# Patient Record
Sex: FEMALE | Race: WHITE | Employment: FULL TIME | URBAN - METROPOLITAN AREA
[De-identification: names, ages, dates, MRNs, and addresses within clinical notes are randomized per-mention and may not be internally consistent; named-entity substitution may affect disease eponyms.]

---

## 2017-01-12 ENCOUNTER — ALLSCRIPTS OFFICE VISIT (OUTPATIENT)
Dept: OTHER | Facility: OTHER | Age: 22
End: 2017-01-12

## 2017-02-27 ENCOUNTER — GENERIC CONVERSION - ENCOUNTER (OUTPATIENT)
Dept: OTHER | Facility: OTHER | Age: 22
End: 2017-02-27

## 2017-05-15 ENCOUNTER — ALLSCRIPTS OFFICE VISIT (OUTPATIENT)
Dept: OTHER | Facility: OTHER | Age: 22
End: 2017-05-15

## 2017-07-31 ENCOUNTER — GENERIC CONVERSION - ENCOUNTER (OUTPATIENT)
Dept: OTHER | Facility: OTHER | Age: 22
End: 2017-07-31

## 2017-09-22 ENCOUNTER — ALLSCRIPTS OFFICE VISIT (OUTPATIENT)
Dept: OTHER | Facility: OTHER | Age: 22
End: 2017-09-22

## 2017-09-22 DIAGNOSIS — H65.01 ACUTE SEROUS OTITIS MEDIA OF RIGHT EAR: ICD-10-CM

## 2017-09-22 DIAGNOSIS — M26.621 ARTHRALGIA OF RIGHT TEMPOROMANDIBULAR JOINT: ICD-10-CM

## 2018-01-11 NOTE — PROGRESS NOTES
Assessment    1  URI (upper respiratory infection) (465 9) (J06 9)    Plan  URI (upper respiratory infection)    · Zithromax Z-Gianni 250 MG Oral Tablet (Azithromycin); TAKE 2 TABLETS ON DAY 1  THEN TAKE 1 TABLET A DAY FOR 4 DAYS   · Call [phone] if: The symptoms are not better in 2 weeks ; Status:Complete;    Done: 03KZH4652   · Call [phone] if: The symptoms seem worse ; Status:Complete;   Done:  24SSI8561   · Seek Immediate Medical Attention if: Breathing starts to have a wheeze or whistling  sound ; Status:Complete;   Done: 40PND4330   · Rapid Flu A and B- POC; Source:Nose; Status:Complete;   Done: 07EAI3783 04:25PM   · Rapid StrepA- POC; Source:Throat; Status:Complete;   Done: 27IBS0949 04:20PM    Discussion/Summary    Rapid strep negative  rapid flu negative  continue with symptom  start Guerraview  use Dulera BID while on antibiotics  Chief Complaint    1  Cold Symptoms   2  Fever, > 36 months   3  Sore Throat    History of Present Illness  HPI: 4 day history of sore throat with nasal congestion  intermittent cough  no wheezing  history of asthma on Dulera daily  she has been using prn DayQuil      Review of Systems    Constitutional: fever and feeling poorly  ENT: earache, but as noted in HPI and no nasal discharge  Cardiovascular: no chest pain  Respiratory: as noted in HPI, no shortness of breath and no wheezing  Gastrointestinal: no nausea, no vomiting and no diarrhea  Musculoskeletal: no myalgias  Integumentary: no rashes  Neurological: no headache  Active Problems    1  Asthma (493 90) (J45 909)   2  Dysmenorrhea (625 3) (N94 6)   3  Eczema (692 9) (L30 9)   4  Moderate persistent asthma (493 90) (J45 40)   5  Sleep disturbances (780 50) (G47 9)    Past Medical History    1  History of Cellulitis of right leg (682 6) (L03 115)   2  History of Concussion (V15 52)   3  History of Laceration Of Lip (873 43)   4   History of Left serous otitis media (381 4) (H65 92)    Family History    1  Family history of Colon Cancer (V16 0)    2  Family history of Hypertension (V17 49)    3  Family history of Heart Disease (V17 49)    4  Family history of Heart Disease (V17 49)    Social History    · Denied: History of Alcohol Use (History)   · Denied: History of Daily Coffee Consumption (___ Cups/Day)   · Denied: History of Daily Cola Consumption (___ Cans/Day)   · Denied: History of Daily Tea Consumption (___ Cups/Day)   · Marital History - Single   · Never A Smoker    Surgical History    1  History of Ear Pressure Equalization Tube, Insertion, Bilaterally   2  History of Ear Surgery   3  History of Nose Surgery   4  History of Sinus Surgery    Current Meds   1  Allegra 180 MG TABS; Therapy: (Recorded:60Guv5027) to Recorded   2  Dulera AERO; Therapy: (Recorded:10Mar2014) to Recorded   3  EpiPen 2-Gianni 0 3 MG/0 3ML Injection Solution Auto-injector; Therapy: 77UNS6017 to Recorded   4  Gildess 1/20 TABS; Take 1 tablet daily Recorded   5  ProAir  (90 Base) MCG/ACT Inhalation Aerosol Solution; Therapy: 58Dco1965 to (Last Rx:59Elx6489)  Requested for: 81Vyy7813 Ordered    Allergies    1  No Known Drug Allergies    2  Nuts    Vitals   Recorded: 61Lqg1372 04:09PM   Temperature 99 3 F   Heart Rate 76   Respiration 16   Systolic 128   Diastolic 68   Height 5 ft 4 5 in   Weight 218 lb    BMI Calculated 36 84   BSA Calculated 2 04     Physical Exam    Constitutional - General appearance: No acute distress, well appearing and well nourished  Head and Face - Palpation of the face and sinuses: Normal, no sinus tenderness  Eyes - Conjunctiva and lids: No injection, edema or discharge  Ears, Nose, Mouth, and Throat - Otoscopic examination: Tympanic membranes gray, translucent with good bony landmarks and light reflex  Canals patent without erythema  Oropharynx: Abnormal  The posterior pharynx was erythematous  Neck - Neck: Supple, symmetric, no masses  Thyroid: No thyromegaly     Pulmonary - Auscultation of lungs: Clear bilaterally  Cardiovascular - Auscultation of heart: Regular rate and rhythm, normal S1 and S2, no murmur  Lymphatic - Palpation of lymph nodes in neck: No anterior or posterior cervical lymphadenopathy        Results/Data  Rapid Flu A and B- POC 12TWX7391 04:25PM [facility]     Test Name Result Flag Reference   Rapid Flu A Negative     Rapid Flu B Negative       Rapid StrepA- POC 33JVO4493 04:20PM [facility]     Test Name Result Flag Reference   Rapid Strep Negative         Future Appointments    Date/Time Provider Specialty Site   06/24/2016 11:00 AM Reymundo Molina,  Internal Medicine [place] [address]   Electronically signed by : RONALD Pate ,MD; Feb 22 2016  5:34PM EST                       (Author)

## 2018-01-13 VITALS
HEIGHT: 66 IN | BODY MASS INDEX: 33.01 KG/M2 | TEMPERATURE: 98.4 F | HEART RATE: 80 BPM | WEIGHT: 205.38 LBS | RESPIRATION RATE: 16 BRPM | DIASTOLIC BLOOD PRESSURE: 70 MMHG | SYSTOLIC BLOOD PRESSURE: 114 MMHG

## 2018-01-14 NOTE — PROGRESS NOTES
Assessment    1  Well adult exam (V70 0) (Z00 00)   2  Social anxiety disorder (300 23) (F40 10)    Discussion/Summary  health maintenance visit Currently, she eats a healthy diet  cervical cancer screening is current Breast cancer screening: mammogram has been ordered and breast cancer screening is not indicated  Advice and education were given regarding weight loss and vitamin D supplements  Patient discussion: discussed with the patient  PT WITH ANXIETY- SHE IS ON ESCITALOPRAM 20 MG BUT FEELS UNEMOTIONAL - WILL CUT IN HALF AND IF NO BETTER IN 2 WEEKS WILL ADD EFFEXOR MID DAY;   [id number] COMPLETE  FOLLOW UP AFTER CHANGING DOSE;  The patient was counseled regarding diagnostic results, instructions for management, risk factor reductions, prognosis, patient and family education, impressions, risks and benefits of treatment options, importance of compliance with treatment  Chief Complaint  PATIENT HERE FOR COLLEGE PHYSICAL  SHE FEELS WELL       History of Present Illness  HM, Adult Female: The patient is being seen for a health maintenance evaluation  The last health maintenance visit was 12 month(s) ago  General Health: She has regular dental visits  She denies vision problems  She denies hearing loss  Immunizations status: up to date  Lifestyle:  She consumes a diverse and healthy diet  She does not have any weight concerns  She exercises regularly  She does not use tobacco  She denies alcohol use  She denies drug use  Reproductive health: the patient is premenopausal    Screening: cancer screening reviewed and current  metabolic screening reviewed and current  risk screening reviewed and current  HPI: PATIENT HERE FOR COLLEGE PHYSICAL  SHE WILL BE LIVING OFF CAMPUS      Review of Systems    Constitutional: No fever, no chills, feels well, no tiredness, no recent weight gain or weight loss, not feeling poorly and not feeling tired     Eyes: No complaints of eye pain, no red eyes, no eyesight problems, no discharge, no dry eyes, no itching of eyes, no eyesight problems and no purulent discharge from the eyes  ENT: no complaints of earache, no loss of hearing, no nose bleeds, no nasal discharge, no sore throat, no hoarseness, no earache, no nosebleeds, no sore throat, no hearing loss, no nasal discharge and no hoarseness  Cardiovascular: No complaints of slow heart rate, no fast heart rate, no chest pain, no palpitations, no leg claudication, no lower extremity edema, the heart rate was not slow and the heart rate was not fast    Respiratory: No complaints of shortness of breath, no wheezing, no cough, no SOB on exertion, no orthopnea, no PND, no shortness of breath, no cough and no shortness of breath during exertion  Gastrointestinal: No complaints of abdominal pain, no constipation, no nausea or vomiting, no diarrhea, no bloody stools  Genitourinary: No complaints of dysuria, no incontinence, no pelvic pain, no dysmenorrhea, no vaginal discharge or bleeding, no dysuria and no incontinence  Musculoskeletal: No complaints of arthralgias, no myalgias, no joint swelling or stiffness, no limb pain or swelling, no arthralgias, no joint swelling and no joint stiffness  Integumentary: No complaints of skin rash or lesions, no itching, no skin wounds, no breast pain or lump, no rashes, no itching, no skin lesions and no skin wound  Neurological: No complaints of headache, no confusion, no convulsions, no numbness, no dizziness or fainting, no tingling, no limb weakness, no difficulty walking  Psychiatric: Not suicidal, no sleep disturbance, no anxiety or depression, no change in personality, no emotional problems, no anxiety, no sleep disturbances and no depression  Endocrine: No complaints of proptosis, no hot flashes, no muscle weakness, no deepening of the voice, no feelings of weakness, no proptosis and no hot flashes     Hematologic/Lymphatic: No complaints of swollen glands, no swollen glands in the neck, does not bleed easily, does not bruise easily, no swollen glands, no tendency for easy bleeding, no tendency for easy bruising and no swollen glands in the neck  ROS reviewed  Active Problems    1  Allergic rhinitis (477 9) (J30 9)   2  Asthma (493 90) (J45 909)   3  Dysmenorrhea (625 3) (N94 6)   4  Eczema (692 9) (L30 9)   5  Moderate persistent asthma (493 90) (J45 40)   6  Need for Menactra vaccination (V03 89) (Z23)   7  Sleep disturbances (780 50) (G47 9)   8  Social anxiety disorder (300 23) (F40 10)   9  Well adult exam (V70 0) (Z00 00)    Past Medical History    · History of Cellulitis of right leg (682 6) (L03 115)   · History of Concussion (V15 52)   · History of Laceration Of Lip (873 43)   · History of Left serous otitis media (381 4) (H65 92)    Surgical History    · History of Ear Pressure Equalization Tube, Insertion, Bilaterally   · History of Ear Surgery   · History of Nose Surgery   · History of Sinus Surgery    Family History  Maternal Grandmother    · Family history of Colon Cancer (V16 0)  Paternal Grandmother    · Family history of Hypertension (V17 49)  Maternal Grandfather    · Family history of Heart Disease (V17 49)  Paternal Grandfather    · Family history of Heart Disease (V17 49)    Social History    · Denied: History of Alcohol Use (History)   · Denied: History of Daily Coffee Consumption (___ Cups/Day)   · Denied: History of Daily Cola Consumption (___ Cans/Day)   · Denied: History of Daily Tea Consumption (___ Cups/Day)   · Marital History - Single   · Never A Smoker    Current Meds   1  Allegra 180 MG TABS (Fexofenadine HCl); Therapy: (Recorded:47Jaw8117) to Recorded   2  Ashlyna 0 15-0 03 &0 01 MG Oral Tablet; TAKE 1 TABLET DAILY; Therapy: 82NLH5672 to (Evaluate:07Jan2018)  Requested for: 48PVM9352; Last   Rx:12Jan2017 Ordered   3  Dulera AERO; Therapy: (Recorded:10Mar2014) to Recorded   4   EpiPen 2-Gianni 0 3 MG/0 3ML Injection Solution Auto-injector; Therapy: 75QVP2074 to Recorded   5  Escitalopram Oxalate 20 MG Oral Tablet; take 1 tablet every day; Therapy: 86YAS6784 to (72 240 26 09)  Requested for: 76IKG8980; Last   Rx:12Jan2017 Ordered   6  ProAir  (90 Base) MCG/ACT Inhalation Aerosol Solution; INHALE 1 TO 2 PUFFS   EVERY 4 TO 6 HOURS AS NEEDED; Therapy: 41Kqh9128 to (Evaluate:07Jan2018)  Requested for: 83JQI3312; Last   Rx:12Jan2017 Ordered    Allergies    1  No Known Drug Allergies    2  Nuts    Vitals   Recorded: 06AXW1415 02:28PM   Temperature 97 6 F   Heart Rate 76   Respiration 18   Systolic 671   Diastolic 78   Height 5 ft 5 75 in   Weight 217 lb 6 4 oz   BMI Calculated 35 36   BSA Calculated 2 07   BMI Percentile 97 %   2-20 Stature Percentile 71 %   2-20 Weight Percentile 99 %     Physical Exam    Constitutional   General appearance: No acute distress, well appearing and well nourished  Eyes   Conjunctiva and lids: No swelling, erythema or discharge  Pupils and irises: Equal, round, reactive to light  Ears, Nose, Mouth, and Throat   External inspection of ears and nose: Normal     Otoscopic examination: Tympanic membranes translucent with normal light reflex  Canals patent without erythema  Nasal mucosa, septum, and turbinates: Normal without edema or erythema  Lips, teeth, and gums: Normal, good dentition  Oropharynx: Normal with no erythema, edema, exudate or lesions  Inspection of the oropharynx showed fully visible tonsils, uvula and soft palate (Mallampati class 1)  Oral mucosa was moist, but was normal  The palate examination showed no abnormalities  The tongue was normal  The tonsils were normal    Neck   Neck: Supple, symmetric, trachea midline, no masses  Thyroid: Normal, no thyromegaly  Pulmonary   Respiratory effort: No increased work of breathing or signs of respiratory distress  Percussion of chest: Normal     Auscultation of lungs: Clear to auscultation    Auscultation of the lungs revealed no expiratory wheezing, normal expiratory time and no inspiratory wheezing  no rales or crackles were heard bilaterally  no rhonchi  no friction rub  no wheezing  no diminished breath sounds  no bronchial breath sounds  Cardiovascular   Palpation of heart: Normal PMI, no thrills  Auscultation of heart: Normal rate and rhythm, normal S1 and S2, no murmurs  Carotid pulses: 2+ bilaterally  Pedal pulses: 2+ bilaterally  Examination of extremities for edema and/or varicosities: Normal     Abdomen   Abdomen: Non-tender, no masses  Liver and spleen: No hepatomegaly or splenomegaly  Lymphatic   Palpation of lymph nodes in neck: No lymphadenopathy  Musculoskeletal   Gait and station: Normal     Digits and nails: Normal without clubbing or cyanosis  Joints, bones, and muscles: Normal     Range of motion: Normal     Skin   Skin and subcutaneous tissue: Normal without rashes or lesions  Palpation of skin and subcutaneous tissue: Normal turgor  Neurologic   Cranial nerves: Cranial nerves II-XII intact  Cortical function: Normal mental status  Sensation: No sensory loss  Psychiatric   Judgment and insight: Normal     Orientation to person, place, and time: Normal        Health Management  Health Maintenance   Pediatric / Adolescent Wellness Visit; every 1 year; Next Due: 49Tdh7897; Overdue    Signatures   Electronically signed by :  Mariusz [address] [place] [address]; May 15 2017  3:12PM EST                       (Author)

## 2018-01-15 VITALS
HEART RATE: 68 BPM | WEIGHT: 217 LBS | SYSTOLIC BLOOD PRESSURE: 102 MMHG | HEIGHT: 66 IN | DIASTOLIC BLOOD PRESSURE: 74 MMHG | BODY MASS INDEX: 34.87 KG/M2 | RESPIRATION RATE: 16 BRPM | TEMPERATURE: 99.1 F

## 2018-01-22 VITALS
RESPIRATION RATE: 18 BRPM | BODY MASS INDEX: 34.94 KG/M2 | DIASTOLIC BLOOD PRESSURE: 78 MMHG | HEART RATE: 76 BPM | WEIGHT: 217.4 LBS | HEIGHT: 66 IN | TEMPERATURE: 97.6 F | SYSTOLIC BLOOD PRESSURE: 112 MMHG

## 2018-02-03 DIAGNOSIS — F41.1 GENERALIZED ANXIETY DISORDER: Primary | ICD-10-CM

## 2018-02-04 RX ORDER — ESCITALOPRAM OXALATE 20 MG/1
TABLET ORAL
Qty: 30 TABLET | Refills: 3 | Status: SHIPPED | OUTPATIENT
Start: 2018-02-04 | End: 2018-02-13 | Stop reason: SDUPTHER

## 2018-02-12 ENCOUNTER — TELEPHONE (OUTPATIENT)
Dept: FAMILY MEDICINE CLINIC | Facility: CLINIC | Age: 23
End: 2018-02-12

## 2018-02-13 DIAGNOSIS — F41.1 GENERALIZED ANXIETY DISORDER: ICD-10-CM

## 2018-02-13 RX ORDER — ESCITALOPRAM OXALATE 20 MG/1
20 TABLET ORAL DAILY
Qty: 90 TABLET | Refills: 3 | Status: SHIPPED | OUTPATIENT
Start: 2018-02-13 | End: 2018-05-07

## 2018-05-07 ENCOUNTER — APPOINTMENT (OUTPATIENT)
Dept: LAB | Facility: MEDICAL CENTER | Age: 23
End: 2018-05-07
Payer: COMMERCIAL

## 2018-05-07 ENCOUNTER — OFFICE VISIT (OUTPATIENT)
Dept: FAMILY MEDICINE CLINIC | Facility: CLINIC | Age: 23
End: 2018-05-07
Payer: COMMERCIAL

## 2018-05-07 VITALS
BODY MASS INDEX: 32.46 KG/M2 | HEIGHT: 65 IN | TEMPERATURE: 97.8 F | RESPIRATION RATE: 16 BRPM | DIASTOLIC BLOOD PRESSURE: 70 MMHG | WEIGHT: 194.8 LBS | HEART RATE: 68 BPM | SYSTOLIC BLOOD PRESSURE: 100 MMHG

## 2018-05-07 DIAGNOSIS — B27.90 MONONUCLEOSIS SYNDROME: ICD-10-CM

## 2018-05-07 DIAGNOSIS — R23.8 BRUISES EASILY: ICD-10-CM

## 2018-05-07 DIAGNOSIS — E55.9 VITAMIN D DEFICIENCY: ICD-10-CM

## 2018-05-07 DIAGNOSIS — R53.82 CHRONIC FATIGUE: Primary | ICD-10-CM

## 2018-05-07 LAB
25(OH)D3 SERPL-MCNC: 29.1 NG/ML (ref 30–100)
ALBUMIN SERPL BCP-MCNC: 3.9 G/DL (ref 3.5–5)
ALP SERPL-CCNC: 51 U/L (ref 46–116)
ALT SERPL W P-5'-P-CCNC: 90 U/L (ref 12–78)
ANION GAP SERPL CALCULATED.3IONS-SCNC: 8 MMOL/L (ref 4–13)
AST SERPL W P-5'-P-CCNC: 45 U/L (ref 5–45)
BASOPHILS # BLD AUTO: 0.03 THOUSANDS/ΜL (ref 0–0.1)
BASOPHILS NFR BLD AUTO: 1 % (ref 0–1)
BILIRUB SERPL-MCNC: 0.41 MG/DL (ref 0.2–1)
BUN SERPL-MCNC: 11 MG/DL (ref 5–25)
CALCIUM SERPL-MCNC: 8.9 MG/DL (ref 8.3–10.1)
CHLORIDE SERPL-SCNC: 107 MMOL/L (ref 100–108)
CO2 SERPL-SCNC: 24 MMOL/L (ref 21–32)
CREAT SERPL-MCNC: 0.8 MG/DL (ref 0.6–1.3)
EOSINOPHIL # BLD AUTO: 0.25 THOUSAND/ΜL (ref 0–0.61)
EOSINOPHIL NFR BLD AUTO: 4 % (ref 0–6)
ERYTHROCYTE [DISTWIDTH] IN BLOOD BY AUTOMATED COUNT: 13.7 % (ref 11.6–15.1)
GFR SERPL CREATININE-BSD FRML MDRD: 106 ML/MIN/1.73SQ M
GLUCOSE P FAST SERPL-MCNC: 83 MG/DL (ref 65–99)
HCT VFR BLD AUTO: 42.6 % (ref 34.8–46.1)
HGB BLD-MCNC: 14.1 G/DL (ref 11.5–15.4)
LYMPHOCYTES # BLD AUTO: 2.27 THOUSANDS/ΜL (ref 0.6–4.47)
LYMPHOCYTES NFR BLD AUTO: 34 % (ref 14–44)
MCH RBC QN AUTO: 29.1 PG (ref 26.8–34.3)
MCHC RBC AUTO-ENTMCNC: 33.1 G/DL (ref 31.4–37.4)
MCV RBC AUTO: 88 FL (ref 82–98)
MONOCYTES # BLD AUTO: 0.55 THOUSAND/ΜL (ref 0.17–1.22)
MONOCYTES NFR BLD AUTO: 8 % (ref 4–12)
NEUTROPHILS # BLD AUTO: 3.49 THOUSANDS/ΜL (ref 1.85–7.62)
NEUTS SEG NFR BLD AUTO: 53 % (ref 43–75)
NRBC BLD AUTO-RTO: 0 /100 WBCS
PLATELET # BLD AUTO: 221 THOUSANDS/UL (ref 149–390)
PMV BLD AUTO: 10.7 FL (ref 8.9–12.7)
POTASSIUM SERPL-SCNC: 4.1 MMOL/L (ref 3.5–5.3)
PROT SERPL-MCNC: 7.7 G/DL (ref 6.4–8.2)
RBC # BLD AUTO: 4.84 MILLION/UL (ref 3.81–5.12)
SODIUM SERPL-SCNC: 139 MMOL/L (ref 136–145)
TSH SERPL DL<=0.05 MIU/L-ACNC: 3.69 UIU/ML (ref 0.46–3.98)
WBC # BLD AUTO: 6.6 THOUSAND/UL (ref 4.31–10.16)

## 2018-05-07 PROCEDURE — 84443 ASSAY THYROID STIM HORMONE: CPT | Performed by: FAMILY MEDICINE

## 2018-05-07 PROCEDURE — 82306 VITAMIN D 25 HYDROXY: CPT

## 2018-05-07 PROCEDURE — 80053 COMPREHEN METABOLIC PANEL: CPT | Performed by: FAMILY MEDICINE

## 2018-05-07 PROCEDURE — 85025 COMPLETE CBC W/AUTO DIFF WBC: CPT | Performed by: FAMILY MEDICINE

## 2018-05-07 PROCEDURE — 86308 HETEROPHILE ANTIBODY SCREEN: CPT

## 2018-05-07 PROCEDURE — 36415 COLL VENOUS BLD VENIPUNCTURE: CPT | Performed by: FAMILY MEDICINE

## 2018-05-07 PROCEDURE — 99214 OFFICE O/P EST MOD 30 MIN: CPT | Performed by: FAMILY MEDICINE

## 2018-05-07 RX ORDER — OXYCODONE HYDROCHLORIDE AND ACETAMINOPHEN 5; 325 MG/1; MG/1
1 TABLET ORAL
COMMUNITY
Start: 2018-05-03 | End: 2018-07-25 | Stop reason: ALTCHOICE

## 2018-05-07 RX ORDER — FEXOFENADINE HCL 180 MG/1
TABLET ORAL
COMMUNITY
End: 2018-05-31 | Stop reason: SDUPTHER

## 2018-05-07 RX ORDER — PREDNISONE 10 MG/1
10 TABLET ORAL DAILY
Refills: 0 | COMMUNITY
Start: 2018-03-31 | End: 2020-10-20

## 2018-05-07 RX ORDER — FEXOFENADINE HCL 180 MG/1
TABLET ORAL
COMMUNITY

## 2018-05-07 RX ORDER — ESCITALOPRAM OXALATE 10 MG/1
10 TABLET ORAL
COMMUNITY
End: 2018-07-25 | Stop reason: CLARIF

## 2018-05-07 RX ORDER — ALBUTEROL SULFATE 90 UG/1
2 AEROSOL, METERED RESPIRATORY (INHALATION) EVERY 4 HOURS PRN
COMMUNITY
Start: 2016-10-20 | End: 2020-10-20

## 2018-05-07 RX ORDER — EPINEPHRINE 0.3 MG/.3ML
INJECTION SUBCUTANEOUS
COMMUNITY
Start: 2014-10-15 | End: 2020-03-13 | Stop reason: SDUPTHER

## 2018-05-07 RX ORDER — LEVONORGESTREL / ETHINYL ESTRADIOL AND ETHINYL ESTRADIOL 150-30(84)
1 KIT ORAL DAILY
COMMUNITY
Start: 2017-01-12 | End: 2020-08-11 | Stop reason: SDUPTHER

## 2018-05-07 NOTE — PROGRESS NOTES
FAMILY MEDICINE PROGRESS NOTE  Ambreen Vasquez 23 y o  female   DATE: May 7, 2018     ASSESSMENT and PLAN:  Ambreen Vasquez is a 23 y o  female with:     Vitamin D deficiency  Last checked in 2014 and was low at 20, given fatigue, will recheck Vit D level    Mononucleosis syndrome  Pt has a history of "recurrent mono," less likely that her fatigue is related to another episode of mono since having no other URI symptoms, but will check with bloodwork  Chronic fatigue  No clear etiology, though likely related to poor sleep hygiene and likely has mild depression  Pt is on Lexapro 10mg (20mg made her feel like a zombie) for her anxiety and does help  -CBC given associated bruising and epistaxis  -TSH, BMP  -Vit, D and Mono as below  -Keep diary of depression symptoms and may need additional symptoms      SUBJECTIVE:  Ambreen Vasquez is a 23 y o  female who presents today with a chief complaint of Nose Bleed (2 in 1 week) and Fatigue (last several months)  Has been feeling really tired for the past few months, gradual onset since Oct 2017  Says this has happened before and she had mono twice  She had a cold in November and took cold medicine then, but has no more URI symptoms  Sleep: no problems falling asleep, usually 12-2am and then wakes up at 11:30 am because of school schedules  Substances: Denies smoking, illicit drugs, alcohol  Drinks coffee very rarely  Moods: Takes Escitalopram for anxiety, but does also describe depressive moods   Does have some excess bruising      Sees ENT and has recurrent salivary gland infections and her neck feels swollen  Plan to get scans, but hasn't been back to ENT yet  Also concerned because she had two episodes of nosebleeds 1 month ago and none since then  Nose Bleed      Fatigue   This is a recurrent problem  The problem occurs constantly  Associated symptoms include fatigue   Pertinent negatives include no abdominal pain, chest pain, chills, congestion, coughing, fever, headaches, nausea, sore throat or vomiting  She has tried nothing for the symptoms  Review of Systems   Constitutional: Positive for fatigue  Negative for activity change, chills and fever  HENT: Positive for nosebleeds  Negative for congestion, ear pain, rhinorrhea, sinus pain, sinus pressure, sneezing and sore throat  Eyes: Negative for discharge and visual disturbance  Respiratory: Negative for cough and shortness of breath  Cardiovascular: Negative for chest pain and palpitations  Gastrointestinal: Negative for abdominal pain, constipation, diarrhea, nausea and vomiting  Neurological: Negative for dizziness, light-headedness and headaches  I have reviewed the patient's PMH, Social History, Medication List and Allergies  OBJECTIVE:  /70   Pulse 68   Temp 97 8 °F (36 6 °C)   Resp 16   Ht 5' 5" (1 651 m)   Wt 88 4 kg (194 lb 12 8 oz)   BMI 32 42 kg/m²   Physical Exam   Constitutional: She appears well-developed and well-nourished  No distress  HENT:   Head: Normocephalic and atraumatic  Right Ear: External ear normal    Left Ear: External ear normal    Nose: Right sinus exhibits no maxillary sinus tenderness and no frontal sinus tenderness  Left sinus exhibits no maxillary sinus tenderness and no frontal sinus tenderness  Mouth/Throat: Uvula is midline, oropharynx is clear and moist and mucous membranes are normal  No oropharyngeal exudate, posterior oropharyngeal edema, posterior oropharyngeal erythema or tonsillar abscesses  Eyes: Conjunctivae are normal  Right eye exhibits no discharge  Left eye exhibits no discharge  Neck: Normal range of motion  Neck supple  No thyromegaly present  Cardiovascular: Normal rate, regular rhythm and normal heart sounds  No murmur heard  Pulmonary/Chest: Effort normal and breath sounds normal  No respiratory distress  She has no wheezes  She has no rales  Abdominal: Soft   Normal appearance and bowel sounds are normal  There is no tenderness  Lymphadenopathy:     She has no cervical adenopathy  Neurological: She is alert  Skin: She is not diaphoretic  Vitals reviewed  Kat Ordonez MD

## 2018-05-07 NOTE — ASSESSMENT & PLAN NOTE
Pt has a history of "recurrent mono," less likely that her fatigue is related to another episode of mono since having no other URI symptoms, but will check with bloodwork

## 2018-05-07 NOTE — ASSESSMENT & PLAN NOTE
No clear etiology, though likely related to poor sleep hygiene and likely has mild depression  Pt is on Lexapro 10mg (20mg made her feel like a zombie) for her anxiety and does help     -CBC given associated bruising and epistaxis  -TSH, BMP  -Vit, D and Mono as below  -Keep diary of depression symptoms and may need additional symptoms

## 2018-05-08 ENCOUNTER — TELEPHONE (OUTPATIENT)
Dept: FAMILY MEDICINE CLINIC | Facility: CLINIC | Age: 23
End: 2018-05-08

## 2018-05-08 LAB — HETEROPH AB SER QL: NEGATIVE

## 2018-05-08 NOTE — TELEPHONE ENCOUNTER
Please call Vel Carter and let her know that her labs were normal  She has normal kidney function, liver function, electrolytes, blood counts, thyroid, etc  Her mono test was also negative  The only thing that was abnormal was a slightly low Vit D level, she should do 1,000 units Vit D oral OTC daily

## 2018-05-31 ENCOUNTER — OFFICE VISIT (OUTPATIENT)
Dept: URGENT CARE | Facility: MEDICAL CENTER | Age: 23
End: 2018-05-31
Payer: COMMERCIAL

## 2018-05-31 VITALS
TEMPERATURE: 98.4 F | HEART RATE: 75 BPM | WEIGHT: 206.8 LBS | DIASTOLIC BLOOD PRESSURE: 78 MMHG | SYSTOLIC BLOOD PRESSURE: 110 MMHG | BODY MASS INDEX: 34.41 KG/M2 | OXYGEN SATURATION: 98 %

## 2018-05-31 DIAGNOSIS — R31.9 URINARY TRACT INFECTION WITH HEMATURIA, SITE UNSPECIFIED: ICD-10-CM

## 2018-05-31 DIAGNOSIS — R30.0 DYSURIA: Primary | ICD-10-CM

## 2018-05-31 DIAGNOSIS — N39.0 URINARY TRACT INFECTION WITH HEMATURIA, SITE UNSPECIFIED: ICD-10-CM

## 2018-05-31 LAB
SL AMB  POCT GLUCOSE, UA: ABNORMAL
SL AMB  POCT GLUCOSE, UA: ABNORMAL
SL AMB LEUKOCYTE ESTERASE,UA: ABNORMAL
SL AMB LEUKOCYTE ESTERASE,UA: ABNORMAL
SL AMB POCT BILIRUBIN,UA: ABNORMAL
SL AMB POCT BILIRUBIN,UA: ABNORMAL
SL AMB POCT BLOOD,UA: ABNORMAL
SL AMB POCT BLOOD,UA: ABNORMAL
SL AMB POCT CLARITY,UA: ABNORMAL
SL AMB POCT CLARITY,UA: ABNORMAL
SL AMB POCT COLOR,UA: YELLOW
SL AMB POCT COLOR,UA: YELLOW
SL AMB POCT KETONES,UA: ABNORMAL
SL AMB POCT KETONES,UA: ABNORMAL
SL AMB POCT NITRITE,UA: ABNORMAL
SL AMB POCT NITRITE,UA: ABNORMAL
SL AMB POCT PH,UA: 6.5
SL AMB POCT PH,UA: 6.5
SL AMB POCT SPECIFIC GRAVITY,UA: 1.01
SL AMB POCT SPECIFIC GRAVITY,UA: 1.01
SL AMB POCT URINE PROTEIN: ABNORMAL
SL AMB POCT URINE PROTEIN: ABNORMAL
SL AMB POCT UROBILINOGEN: 0.2
SL AMB POCT UROBILINOGEN: 0.2

## 2018-05-31 PROCEDURE — 81002 URINALYSIS NONAUTO W/O SCOPE: CPT | Performed by: PHYSICIAN ASSISTANT

## 2018-05-31 PROCEDURE — 87086 URINE CULTURE/COLONY COUNT: CPT | Performed by: PHYSICIAN ASSISTANT

## 2018-05-31 PROCEDURE — S9083 URGENT CARE CENTER GLOBAL: HCPCS | Performed by: PHYSICIAN ASSISTANT

## 2018-05-31 PROCEDURE — G0382 LEV 3 HOSP TYPE B ED VISIT: HCPCS | Performed by: PHYSICIAN ASSISTANT

## 2018-05-31 RX ORDER — SULFAMETHOXAZOLE AND TRIMETHOPRIM 800; 160 MG/1; MG/1
1 TABLET ORAL EVERY 12 HOURS SCHEDULED
Qty: 14 TABLET | Refills: 0 | Status: SHIPPED | OUTPATIENT
Start: 2018-05-31 | End: 2018-06-07

## 2018-05-31 NOTE — PROGRESS NOTES
[facility] [employer] [facility]        NAME: Zohaib Juárez is a 23 y o  female  : 1995    MRN: [id number]  DATE: May 31, 2018  TIME: 7:56 PM    Assessment and Plan   Dysuria [R30 0]  1  Dysuria  POCT urine dip   2  Urinary tract infection with hematuria, site unspecified  POCT urine dip    Urine culture    sulfamethoxazole-trimethoprim (BACTRIM DS) 800-160 mg per tablet         Patient Instructions       Follow up with PCP in 3-5 days  Proceed to  ER if symptoms worsen  Chief Complaint     Chief Complaint   Patient presents with    Possible UTI     dysuria, abdominal pressure, frequent urination X 2 days         History of Present Illness       The patient is a 30-year-old female presents with a 2 day history of increased urinary frequency, burning and painful urination  She denies any fever, back or abdominal pain but has noticed visible blood in her urine  Review of Systems   Review of Systems   Constitutional: Negative  Gastrointestinal: Negative  Genitourinary: Positive for dysuria, frequency and hematuria           Current Medications       Current Outpatient Prescriptions:     albuterol (PROVENTIL HFA,VENTOLIN HFA) 90 mcg/act inhaler, Inhale 2 puffs every 4 (four) hours, Disp: , Rfl:     escitalopram (LEXAPRO) 10 mg tablet, Take 10 mg by mouth, Disp: , Rfl:     fexofenadine (ALLEGRA ALLERGY) 180 MG tablet, Take by mouth, Disp: , Rfl:     Levonorgest-Eth Estrad -Day (ASHLYNA) 0 15-0 03 &0 01 MG TABS, Take 1 tablet by mouth daily, Disp: , Rfl:     Mometasone Furo-Formoterol Fum (DULERA) 200-5 MCG/ACT AERO, , Disp: , Rfl:     Tiotropium Bromide Monohydrate (SPIRIVA RESPIMAT) 1 25 MCG/ACT AERS, , Disp: , Rfl:     EPINEPHrine (EPIPEN 2-TATIANA) 0 3 mg/0 3 mL SOAJ, Inject as directed, Disp: , Rfl:     oxyCODONE-acetaminophen (PERCOCET) 5-325 mg per tablet, Take 1 tablet by mouth, Disp: , Rfl:     predniSONE 10 mg tablet, Take 10 mg by mouth daily, Disp: , Rfl: 0   sulfamethoxazole-trimethoprim (BACTRIM DS) 800-160 mg per tablet, Take 1 tablet by mouth every 12 (twelve) hours for 7 days, Disp: 14 tablet, Rfl: 0    Current Allergies     Allergies as of 05/31/2018 - Reviewed 05/31/2018   Allergen Reaction Noted    Peanut oil Anaphylaxis 03/11/2015    Treenut  [nuts] Anaphylaxis 10/05/2013    Molds & smuts  01/11/2018            The following portions of the patient's history were reviewed and updated as appropriate: allergies, current medications, past family history, past medical history, past social history, past surgical history and problem list      Past Medical History:   Diagnosis Date    Concussion     Last Assessed:  10/28/13    History of chicken pox     Varicella        Past Surgical History:   Procedure Laterality Date    EAR SURGERY      Pressure equalization tube insertion, bilaterally    NOSE SURGERY      Resolved:  2011    SINUS SURGERY         Family History   Problem Relation Age of Onset    Colon cancer Maternal Grandmother     Heart disease Maternal Grandfather     Hypertension Paternal Grandmother     Heart disease Paternal Grandfather     No Known Problems Mother     No Known Problems Father          Medications have been verified  Objective   /78 (BP Location: Left arm, Patient Position: Sitting, Cuff Size: Standard)   Pulse 75   Temp 98 4 °F (36 9 °C)   Wt 93 8 kg (206 lb 12 8 oz)   SpO2 98%   BMI 34 41 kg/m²        Physical Exam     Physical Exam   Constitutional: She appears well-developed and well-nourished  No distress  Cardiovascular: Normal rate, regular rhythm and normal heart sounds  No murmur heard  Pulmonary/Chest: Effort normal and breath sounds normal    Abdominal: Soft  Bowel sounds are normal  There is no hepatosplenomegaly  There is tenderness in the suprapubic area  There is no rigidity, no rebound and no guarding  Declined

## 2018-05-31 NOTE — PATIENT INSTRUCTIONS
1  Push fluids  2  Take Bactrim 1 tablet twice daily x 7 days  3   Follow-up with PCP if symptoms persist

## 2018-06-01 LAB — BACTERIA UR CULT: NORMAL

## 2018-07-25 ENCOUNTER — OFFICE VISIT (OUTPATIENT)
Dept: FAMILY MEDICINE CLINIC | Facility: CLINIC | Age: 23
End: 2018-07-25
Payer: COMMERCIAL

## 2018-07-25 VITALS
BODY MASS INDEX: 49.11 KG/M2 | TEMPERATURE: 99.8 F | WEIGHT: 212.2 LBS | HEART RATE: 76 BPM | DIASTOLIC BLOOD PRESSURE: 76 MMHG | SYSTOLIC BLOOD PRESSURE: 104 MMHG | HEIGHT: 55 IN

## 2018-07-25 DIAGNOSIS — Z00.00 WELL ADULT EXAM: Primary | ICD-10-CM

## 2018-07-25 DIAGNOSIS — F40.10 SOCIAL ANXIETY DISORDER: ICD-10-CM

## 2018-07-25 DIAGNOSIS — F32.0 CURRENT MILD EPISODE OF MAJOR DEPRESSIVE DISORDER WITHOUT PRIOR EPISODE (HCC): ICD-10-CM

## 2018-07-25 PROBLEM — F32.9 MAJOR DEPRESSIVE DISORDER: Status: ACTIVE | Noted: 2018-07-25

## 2018-07-25 PROCEDURE — 99395 PREV VISIT EST AGE 18-39: CPT | Performed by: INTERNAL MEDICINE

## 2018-07-25 PROCEDURE — 99213 OFFICE O/P EST LOW 20 MIN: CPT | Performed by: INTERNAL MEDICINE

## 2018-07-25 PROCEDURE — 1036F TOBACCO NON-USER: CPT | Performed by: INTERNAL MEDICINE

## 2018-07-25 PROCEDURE — 3008F BODY MASS INDEX DOCD: CPT | Performed by: INTERNAL MEDICINE

## 2018-07-25 RX ORDER — ESCITALOPRAM OXALATE 20 MG/1
10 TABLET ORAL DAILY
COMMUNITY
Start: 2018-07-25 | End: 2019-07-10 | Stop reason: SDUPTHER

## 2018-07-25 RX ORDER — FLUTICASONE PROPIONATE 50 MCG
2 SPRAY, SUSPENSION (ML) NASAL
COMMUNITY
Start: 2018-06-12 | End: 2019-10-02 | Stop reason: ALTCHOICE

## 2018-07-25 RX ORDER — VENLAFAXINE 25 MG/1
TABLET ORAL
Qty: 60 TABLET | Refills: 1 | Status: SHIPPED | OUTPATIENT
Start: 2018-07-25 | End: 2019-07-19 | Stop reason: ALTCHOICE

## 2018-07-25 RX ORDER — OMALIZUMAB 202.5 MG/1.4ML
INJECTION, SOLUTION SUBCUTANEOUS
COMMUNITY
Start: 2018-07-23 | End: 2021-06-30

## 2018-07-25 NOTE — ASSESSMENT & PLAN NOTE
On lexapro 10 mg in am - move to pm dosing   Add venlafaxine 25 mg in am and titrate up  [place] the near future

## 2018-07-25 NOTE — PATIENT INSTRUCTIONS
Add venlafaxine 25 mg 1/2 tab in am for 2 days and move lexapro to supper  After 2 days increase venlafaxine to 25 mg (whole tab) in the am  After 3 days if ok, can icnrease to one in am and one at 2 pm- any later it may cause insomnia    Follow up in 4-6 weeks for recheck

## 2018-07-25 NOTE — PROGRESS NOTES
ASSESSMENT and PLAN:  Adria Murcia is a 23 y o  female with:   Problem List Items Addressed This Visit     Social anxiety disorder    Relevant Medications    escitalopram (LEXAPRO) 20 mg tablet    venlafaxine (EFFEXOR) 25 mg tablet    Major depressive disorder     On lexapro 10 mg in am - move to pm dosing   Add venlafaxine 25 mg in am and titrate up  [place] the near future  Relevant Medications    escitalopram (LEXAPRO) 20 mg tablet    venlafaxine (EFFEXOR) 25 mg tablet    Well adult exam - Primary     Immunizations up to date  Normal menses- on oc - stable  History of asthma- stable currently-  Starting xolair injections per allergist                No results found for this or any previous visit (from the past 1008 hour(s))     Recent Results (from the past 4200 hour(s))   TSH, 3rd generation with T4 reflex    Collection Time: 05/07/18  9:14 AM   Result Value Ref Range    TSH 3RD GENERATON 3 690 0 463 - 3 980 uIU/mL   Comprehensive metabolic panel    Collection Time: 05/07/18  9:14 AM   Result Value Ref Range    Sodium 139 136 - 145 mmol/L    Potassium 4 1 3 5 - 5 3 mmol/L    Chloride 107 100 - 108 mmol/L    CO2 24 21 - 32 mmol/L    Anion Gap 8 4 - 13 mmol/L    BUN 11 5 - 25 mg/dL    Creatinine 0 80 0 60 - 1 30 mg/dL    Glucose, Fasting 83 65 - 99 mg/dL    Calcium 8 9 8 3 - 10 1 mg/dL    AST 45 5 - 45 U/L    ALT 90 (H) 12 - 78 U/L    Alkaline Phosphatase 51 46 - 116 U/L    Total Protein 7 7 6 4 - 8 2 g/dL    Albumin 3 9 3 5 - 5 0 g/dL    Total Bilirubin 0 41 0 20 - 1 00 mg/dL    eGFR 106 ml/min/1 73sq m   CBC and differential    Collection Time: 05/07/18  9:14 AM   Result Value Ref Range    WBC 6 60 4 31 - 10 16 Thousand/uL    RBC 4 84 3 81 - 5 12 Million/uL    Hemoglobin 14 1 11 5 - 15 4 g/dL    Hematocrit 42 6 34 8 - 46 1 %    MCV 88 82 - 98 fL    MCH 29 1 26 8 - 34 3 pg    MCHC 33 1 31 4 - 37 4 g/dL    RDW 13 7 11 6 - 15 1 %    MPV 10 7 8 9 - 12 7 fL Platelets 253 251 - 169 Thousands/uL    nRBC 0 /100 WBCs    Neutrophils Relative 53 43 - 75 %    Lymphocytes Relative 34 14 - 44 %    Monocytes Relative 8 4 - 12 %    Eosinophils Relative 4 0 - 6 %    Basophils Relative 1 0 - 1 %    Neutrophils Absolute 3 49 1 85 - 7 62 Thousands/µL    Lymphocytes Absolute 2 27 0 60 - 4 47 Thousands/µL    Monocytes Absolute 0 55 0 17 - 1 22 Thousand/µL    Eosinophils Absolute 0 25 0 00 - 0 61 Thousand/µL    Basophils Absolute 0 03 0 00 - 0 10 Thousands/µL   Mononucleosis screen    Collection Time: 05/07/18  9:14 AM   Result Value Ref Range    Monotest Negative Negative   Vitamin D 25 hydroxy    Collection Time: 05/07/18  9:14 AM   Result Value Ref Range    Vit D, 25-Hydroxy 29 1 (L) 30 0 - 100 0 ng/mL   POCT urine dip    Collection Time: 05/31/18  7:38 PM   Result Value Ref Range    LEUKOCYTE ESTERASE,UA mod      NITRITE,UA pos     SL AMB POCT UROBILINOGEN 0 2     SL AMB POCT URINE PROTEIN trace      PH,UA 6 5      BLOOD,UA large      SPECIFIC GRAVITY,UA 1 010      KETONES,UA neg      BILIRUBIN,UA neg     GLUCOSE, UA neg      COLOR,UA yellow      CLARITY,UA hazy    POCT urine dip    Collection Time: 05/31/18  7:59 PM   Result Value Ref Range    LEUKOCYTE ESTERASE,UA mod      NITRITE,UA pos     SL AMB POCT UROBILINOGEN 0 2     SL AMB POCT URINE PROTEIN trace      PH,UA 6 5      BLOOD,UA large      SPECIFIC GRAVITY,UA 1 010      KETONES,UA neg      BILIRUBIN,UA neg     GLUCOSE, UA neg      COLOR,UA yellow      CLARITY,UA hazy    Urine culture    Collection Time: 05/31/18  8:16 PM   Result Value Ref Range    Urine Culture <10,000 cfu/ml         SUBJECTIVE:  Perico Mullen is a 23 y o  female who presents today with a chief complaint of Physical Exam (yearly physical ); Earache; and Medication Problem    Patient is here for an annual physical;   She has a question about her medications  she is depressed, sees a therapist and is on celexa- she feels it is working well but she is more depressed and sleeping a lot ; She was told to check with our office for a med adjustment  She filled out phq-9 and it was 19  She has been anxious- but now she feels more depressed  dshe had beeno n fluoxitine and had hand shaking from it  Review of Systems   Constitutional: Positive for activity change (not motivated;  tired ) and fatigue  HENT: Positive for ear pain (ear pain;  b/l)  Negative for hearing loss, mouth sores, nosebleeds, rhinorrhea, sinus pain, sinus pressure and sneezing  Eyes: Negative  Respiratory: Negative  Negative for cough, shortness of breath and stridor  Cardiovascular: Negative  Gastrointestinal: Negative  Endocrine: Negative  Genitourinary: Negative  Musculoskeletal: Negative  Skin: Negative  Allergic/Immunologic: Negative  Neurological: Negative  Hematological: Negative  Psychiatric/Behavioral: Negative  I have reviewed the patient's PMH, Social History, Medication List and Allergies  OBJECTIVE:  /76 (BP Location: Left arm, Patient Position: Sitting, Cuff Size: Large)   Pulse 76   Temp 99 8 °F (37 7 °C)   Ht (!) 5 6" (0 142 m)   Wt 96 3 kg (212 lb 3 2 oz)   LMP 04/30/2018 (Approximate)   Breastfeeding? No   BMI 4757 43 kg/m²   Physical Exam   Constitutional: She is oriented to person, place, and time  She appears well-developed and well-nourished  HENT:   Head: Normocephalic and atraumatic  Right Ear: External ear normal    Left Ear: External ear normal    Nose: Nose normal    Mouth/Throat: Oropharynx is clear and moist    Eyes: Conjunctivae and EOM are normal  Pupils are equal, round, and reactive to light  Neck: Normal range of motion  Neck supple  No JVD present  No tracheal deviation present  No thyromegaly present  Cardiovascular: Normal rate, regular rhythm, normal heart sounds and intact distal pulses  Pulmonary/Chest: Effort normal and breath sounds normal  No respiratory distress   She has no wheezes  Abdominal: Soft  Bowel sounds are normal    Musculoskeletal: Normal range of motion  She exhibits no edema or tenderness  Neurological: She is alert and oriented to person, place, and time  No cranial nerve deficit  Coordination normal    Skin: Skin is warm and dry  No rash noted  No erythema  Psychiatric: She has a normal mood and affect  Her behavior is normal  Judgment and thought content normal    Nursing note and vitals reviewed

## 2018-07-25 NOTE — ASSESSMENT & PLAN NOTE
Immunizations up to date  Normal menses- on oc - stable  History of asthma- stable currently-  Starting xolair injections per allergist

## 2019-01-07 ENCOUNTER — APPOINTMENT (OUTPATIENT)
Dept: LAB | Facility: MEDICAL CENTER | Age: 24
End: 2019-01-07
Payer: COMMERCIAL

## 2019-01-07 ENCOUNTER — TRANSCRIBE ORDERS (OUTPATIENT)
Dept: ADMINISTRATIVE | Facility: HOSPITAL | Age: 24
End: 2019-01-07

## 2019-01-07 DIAGNOSIS — J45.50 SEVERE PERSISTENT ASTHMA WITHOUT COMPLICATION: ICD-10-CM

## 2019-01-07 DIAGNOSIS — J45.50 SEVERE PERSISTENT ASTHMA WITHOUT COMPLICATION: Primary | ICD-10-CM

## 2019-01-07 LAB
ALBUMIN SERPL BCP-MCNC: 3.7 G/DL (ref 3.5–5)
ALP SERPL-CCNC: 56 U/L (ref 46–116)
ALT SERPL W P-5'-P-CCNC: 25 U/L (ref 12–78)
ANION GAP SERPL CALCULATED.3IONS-SCNC: 8 MMOL/L (ref 4–13)
AST SERPL W P-5'-P-CCNC: 15 U/L (ref 5–45)
BASOPHILS # BLD AUTO: 0.09 THOUSANDS/ΜL (ref 0–0.1)
BASOPHILS NFR BLD AUTO: 1 % (ref 0–1)
BILIRUB DIRECT SERPL-MCNC: 0.07 MG/DL (ref 0–0.2)
BILIRUB SERPL-MCNC: 0.16 MG/DL (ref 0.2–1)
BUN SERPL-MCNC: 7 MG/DL (ref 5–25)
CALCIUM SERPL-MCNC: 8.9 MG/DL (ref 8.3–10.1)
CHLORIDE SERPL-SCNC: 107 MMOL/L (ref 100–108)
CO2 SERPL-SCNC: 24 MMOL/L (ref 21–32)
CREAT SERPL-MCNC: 0.76 MG/DL (ref 0.6–1.3)
EOSINOPHIL # BLD AUTO: 0.78 THOUSAND/ΜL (ref 0–0.61)
EOSINOPHIL NFR BLD AUTO: 12 % (ref 0–6)
ERYTHROCYTE [DISTWIDTH] IN BLOOD BY AUTOMATED COUNT: 12.9 % (ref 11.6–15.1)
GFR SERPL CREATININE-BSD FRML MDRD: 112 ML/MIN/1.73SQ M
GLUCOSE P FAST SERPL-MCNC: 89 MG/DL (ref 65–99)
HCT VFR BLD AUTO: 40.7 % (ref 34.8–46.1)
HGB BLD-MCNC: 13 G/DL (ref 11.5–15.4)
IMM GRANULOCYTES # BLD AUTO: 0.04 THOUSAND/UL (ref 0–0.2)
IMM GRANULOCYTES NFR BLD AUTO: 1 % (ref 0–2)
LYMPHOCYTES # BLD AUTO: 2.36 THOUSANDS/ΜL (ref 0.6–4.47)
LYMPHOCYTES NFR BLD AUTO: 35 % (ref 14–44)
MCH RBC QN AUTO: 28.3 PG (ref 26.8–34.3)
MCHC RBC AUTO-ENTMCNC: 31.9 G/DL (ref 31.4–37.4)
MCV RBC AUTO: 89 FL (ref 82–98)
MONOCYTES # BLD AUTO: 0.39 THOUSAND/ΜL (ref 0.17–1.22)
MONOCYTES NFR BLD AUTO: 6 % (ref 4–12)
NEUTROPHILS # BLD AUTO: 3.05 THOUSANDS/ΜL (ref 1.85–7.62)
NEUTS SEG NFR BLD AUTO: 45 % (ref 43–75)
NRBC BLD AUTO-RTO: 0 /100 WBCS
PLATELET # BLD AUTO: 364 THOUSANDS/UL (ref 149–390)
PMV BLD AUTO: 9.9 FL (ref 8.9–12.7)
POTASSIUM SERPL-SCNC: 4.4 MMOL/L (ref 3.5–5.3)
PROT SERPL-MCNC: 7.6 G/DL (ref 6.4–8.2)
RBC # BLD AUTO: 4.59 MILLION/UL (ref 3.81–5.12)
SODIUM SERPL-SCNC: 139 MMOL/L (ref 136–145)
WBC # BLD AUTO: 6.71 THOUSAND/UL (ref 4.31–10.16)

## 2019-01-07 PROCEDURE — 82103 ALPHA-1-ANTITRYPSIN TOTAL: CPT

## 2019-01-07 PROCEDURE — 85025 COMPLETE CBC W/AUTO DIFF WBC: CPT | Performed by: ALLERGY & IMMUNOLOGY

## 2019-01-07 PROCEDURE — 82104 ALPHA-1-ANTITRYPSIN PHENO: CPT

## 2019-01-07 PROCEDURE — 36415 COLL VENOUS BLD VENIPUNCTURE: CPT | Performed by: ALLERGY & IMMUNOLOGY

## 2019-01-07 PROCEDURE — 80053 COMPREHEN METABOLIC PANEL: CPT

## 2019-01-07 PROCEDURE — 86255 FLUORESCENT ANTIBODY SCREEN: CPT

## 2019-01-07 PROCEDURE — 82248 BILIRUBIN DIRECT: CPT | Performed by: ALLERGY & IMMUNOLOGY

## 2019-01-09 LAB
A1AT PHENOTYP SERPL IFE: NORMAL
A1AT SERPL-MCNC: 147 MG/DL (ref 90–200)

## 2019-01-10 LAB
C-ANCA TITR SER IF: NORMAL TITER
MYELOPEROXIDASE AB SER IA-ACNC: <9 U/ML (ref 0–9)
P-ANCA ATYPICAL TITR SER IF: NORMAL TITER
P-ANCA TITR SER IF: NORMAL TITER
PROTEINASE3 AB SER IA-ACNC: <3.5 U/ML (ref 0–3.5)

## 2019-07-10 DIAGNOSIS — F40.10 SOCIAL ANXIETY DISORDER: Primary | ICD-10-CM

## 2019-07-10 RX ORDER — ESCITALOPRAM OXALATE 20 MG/1
TABLET ORAL
Qty: 15 TABLET | Refills: 1 | Status: SHIPPED | OUTPATIENT
Start: 2019-07-10 | End: 2019-07-19

## 2019-07-19 ENCOUNTER — OFFICE VISIT (OUTPATIENT)
Dept: FAMILY MEDICINE CLINIC | Facility: CLINIC | Age: 24
End: 2019-07-19
Payer: COMMERCIAL

## 2019-07-19 VITALS
BODY MASS INDEX: 38.41 KG/M2 | DIASTOLIC BLOOD PRESSURE: 60 MMHG | TEMPERATURE: 99.2 F | OXYGEN SATURATION: 98 % | HEART RATE: 89 BPM | HEIGHT: 66 IN | RESPIRATION RATE: 16 BRPM | WEIGHT: 239 LBS | SYSTOLIC BLOOD PRESSURE: 110 MMHG

## 2019-07-19 DIAGNOSIS — E66.09 CLASS 2 OBESITY DUE TO EXCESS CALORIES WITHOUT SERIOUS COMORBIDITY WITH BODY MASS INDEX (BMI) OF 38.0 TO 38.9 IN ADULT: ICD-10-CM

## 2019-07-19 DIAGNOSIS — F41.9 ANXIETY AND DEPRESSION: Primary | ICD-10-CM

## 2019-07-19 DIAGNOSIS — J45.40 MODERATE PERSISTENT ASTHMA WITHOUT COMPLICATION: ICD-10-CM

## 2019-07-19 DIAGNOSIS — F32.A ANXIETY AND DEPRESSION: Primary | ICD-10-CM

## 2019-07-19 PROBLEM — R53.82 CHRONIC FATIGUE: Status: RESOLVED | Noted: 2018-05-07 | Resolved: 2019-07-19

## 2019-07-19 PROBLEM — F32.9 MAJOR DEPRESSIVE DISORDER: Status: RESOLVED | Noted: 2018-07-25 | Resolved: 2019-07-19

## 2019-07-19 PROBLEM — Z00.00 WELL ADULT EXAM: Status: RESOLVED | Noted: 2018-07-25 | Resolved: 2019-07-19

## 2019-07-19 PROBLEM — B27.90 MONONUCLEOSIS SYNDROME: Status: RESOLVED | Noted: 2018-05-07 | Resolved: 2019-07-19

## 2019-07-19 PROCEDURE — 3008F BODY MASS INDEX DOCD: CPT | Performed by: FAMILY MEDICINE

## 2019-07-19 PROCEDURE — 1036F TOBACCO NON-USER: CPT | Performed by: FAMILY MEDICINE

## 2019-07-19 PROCEDURE — 99214 OFFICE O/P EST MOD 30 MIN: CPT | Performed by: FAMILY MEDICINE

## 2019-07-19 RX ORDER — BUDESONIDE AND FORMOTEROL FUMARATE DIHYDRATE 160; 4.5 UG/1; UG/1
2 AEROSOL RESPIRATORY (INHALATION) 2 TIMES DAILY
Qty: 1 INHALER | Refills: 0 | Status: SHIPPED | COMMUNITY
Start: 2019-07-19 | End: 2020-10-20 | Stop reason: ALTCHOICE

## 2019-07-19 RX ORDER — SERTRALINE HYDROCHLORIDE 25 MG/1
25 TABLET, FILM COATED ORAL DAILY
Qty: 30 TABLET | Refills: 1 | Status: SHIPPED | OUTPATIENT
Start: 2019-07-19 | End: 2019-08-21 | Stop reason: SDUPTHER

## 2019-07-19 NOTE — ASSESSMENT & PLAN NOTE
Wt Readings from Last 3 Encounters:   07/19/19 108 kg (239 lb)   07/25/18 96 3 kg (212 lb 3 2 oz)   05/31/18 93 8 kg (206 lb 12 8 oz)     BMI Counseling: Body mass index is 38 46 kg/m²  Discussed the patient's BMI with her  The BMI is above average  BMI counseling and education was provided to the patient  Nutrition recommendations include reducing portion sizes, decreasing overall calorie intake, moderation in carbohydrate intake and increasing intake of lean protein  Exercise recommendations include moderate aerobic physical activity for 150 minutes/week  Long discussion about healthy weight loss, will log on [employer], reviewed macros   Pt will log and RTC 3 months and re-evaluate weight loss at that time

## 2019-07-19 NOTE — ASSESSMENT & PLAN NOTE
Recently re-established with behavioral therapist  Previously had SI with Venlafaxine  Anxiety controlled with Lexparo, but still has depression  Will stop Lexapro and start Zoloft 25mg, call with update in 2 weeks, can increase to 50mg at that time

## 2019-07-19 NOTE — ASSESSMENT & PLAN NOTE
F/w Pulm and Allergist  Uncontrolled with Dulera, Spiriva  Uses Albuterol PRN    Given sample of Symbicort to use instead of Dulera to see if that helps and f/u with Pulmonology

## 2019-07-19 NOTE — PROGRESS NOTES
FAMILY MEDICINE PROGRESS NOTE  Hannah Almendarez 26 y o  female   DATE: July 19, 2019     ASSESSMENT and PLAN:  Hannah Almendarez is a 26 y o  female with: Anxiety and depression  Recently re-established with behavioral therapist  Previously had SI with Venlafaxine  Anxiety controlled with Lexparo, but still has depression  Will stop Lexapro and start Zoloft 25mg, call with update in 2 weeks, can increase to 50mg at that time  Moderate persistent asthma without complication  F/w Pulm and Allergist  Uncontrolled with Dulera, Spiriva  Uses Albuterol PRN  Given sample of Symbicort to use instead of Dulera to see if that helps and f/u with Pulmonology    Class 2 obesity due to excess calories without serious comorbidity with body mass index (BMI) of 38 0 to 38 9 in adult  Wt Readings from Last 3 Encounters:   07/19/19 108 kg (239 lb)   07/25/18 96 3 kg (212 lb 3 2 oz)   05/31/18 93 8 kg (206 lb 12 8 oz)     BMI Counseling: Body mass index is 38 46 kg/m²  Discussed the patient's BMI with her  The BMI is above average  BMI counseling and education was provided to the patient  Nutrition recommendations include reducing portion sizes, decreasing overall calorie intake, moderation in carbohydrate intake and increasing intake of lean protein  Exercise recommendations include moderate aerobic physical activity for 150 minutes/week  Long discussion about healthy weight loss, will log on [employer], reviewed macros  Pt will log and RTC 3 months and re-evaluate weight loss at that time      SUBJECTIVE:  Hannah Almendarez is a 26 y o  female who presents today with a chief complaint of Follow-up  Hannah Almendarez is here for a 12 month follow-up and to establish with me as a new PCP  The active chronic medical problems and medications are as below:   1  Anxiety- she is on Lexapro 10mg to help her anxiety, which helps  The higher dose made her too numb  She tried Venlafaxine, but made her suicidal, so stopped it   She is seeing a therapist, which is new  She is struggling with more depression and her meds arent helping as much  2  Pet and mold Allergies- gets Zolair for the past year, on antihistamine  3  Asthma- Dulera and Spiriva every day, has a proair that she uses every day recently because of the weather  Sees Pulmonology and will uses prednisone PRN  4  Obesity- did keto diet last year and lost 25 pounds in 1 month, is interested in ideas on how to help with her weight issues since she has regained the weight since she has been off of the keto diet  Review of Systems   Respiratory: Negative for shortness of breath and wheezing  Allergic/Immunologic: Positive for environmental allergies  Psychiatric/Behavioral: Positive for dysphoric mood  The patient is nervous/anxious  I have reviewed the patient's Past Medical History  OBJECTIVE:  /60   Pulse 89   Temp 99 2 °F (37 3 °C)   Resp 16   Ht 5' 6 1" (1 679 m)   Wt 108 kg (239 lb)   LMP 04/14/2019 (Approximate)   SpO2 98%   Breastfeeding? No   BMI 38 46 kg/m²    Physical Exam   Constitutional: She appears well-developed and well-nourished  No distress  obese   Cardiovascular: Normal rate, regular rhythm and normal heart sounds  Pulmonary/Chest: Effort normal and breath sounds normal  No respiratory distress  She has no wheezes  She has no rales  Skin: She is not diaphoretic  Vitals reviewed  Robin Paci  Mae Lopez MD    Note: Portions of the record have been created with voice recognition software  Occasional wrong word or "sound a like" substitutions may have occurred due to the inherent limitations of voice recognition software  Read the chart carefully and recognize, using context, where substitutions have occurred

## 2019-08-21 ENCOUNTER — TELEPHONE (OUTPATIENT)
Dept: FAMILY MEDICINE CLINIC | Facility: CLINIC | Age: 24
End: 2019-08-21

## 2019-08-21 DIAGNOSIS — F41.9 ANXIETY AND DEPRESSION: ICD-10-CM

## 2019-08-21 DIAGNOSIS — F32.A ANXIETY AND DEPRESSION: ICD-10-CM

## 2019-08-21 NOTE — TELEPHONE ENCOUNTER
Patient called stating she has been on the Zoloft and she has not noticed any difference  Patient is requesting for dosage to be increased  Please advise  Patient would like a return call

## 2019-08-21 NOTE — TELEPHONE ENCOUNTER
Have her double it, she can take 2 tablets, or if she is out of the medication, I can send in a higher dose, thanks

## 2019-08-21 NOTE — TELEPHONE ENCOUNTER
I called patient she stated she has less than a weeks worth and would need another refill  She will call back with an update

## 2019-09-06 ENCOUNTER — TRANSITIONAL CARE MANAGEMENT (OUTPATIENT)
Dept: FAMILY MEDICINE CLINIC | Facility: CLINIC | Age: 24
End: 2019-09-06

## 2019-09-06 ENCOUNTER — TELEPHONE (OUTPATIENT)
Dept: FAMILY MEDICINE CLINIC | Facility: CLINIC | Age: 24
End: 2019-09-06

## 2019-09-06 NOTE — TELEPHONE ENCOUNTER
I started TCm encounter tried calling patient to finalize, left message to call back, please make TCM appoint and transfer back to clinical staff

## 2019-10-02 ENCOUNTER — APPOINTMENT (OUTPATIENT)
Dept: LAB | Facility: MEDICAL CENTER | Age: 24
End: 2019-10-02
Payer: COMMERCIAL

## 2019-10-02 ENCOUNTER — OFFICE VISIT (OUTPATIENT)
Dept: FAMILY MEDICINE CLINIC | Facility: CLINIC | Age: 24
End: 2019-10-02
Payer: COMMERCIAL

## 2019-10-02 VITALS
WEIGHT: 238 LBS | RESPIRATION RATE: 16 BRPM | HEART RATE: 98 BPM | TEMPERATURE: 99.1 F | SYSTOLIC BLOOD PRESSURE: 112 MMHG | OXYGEN SATURATION: 98 % | BODY MASS INDEX: 38.3 KG/M2 | DIASTOLIC BLOOD PRESSURE: 60 MMHG

## 2019-10-02 DIAGNOSIS — I95.1 ORTHOSTATIC HYPOTENSION: Primary | ICD-10-CM

## 2019-10-02 DIAGNOSIS — D75.839 THROMBOCYTOSIS: ICD-10-CM

## 2019-10-02 DIAGNOSIS — F32.A ANXIETY AND DEPRESSION: ICD-10-CM

## 2019-10-02 DIAGNOSIS — F41.9 ANXIETY AND DEPRESSION: ICD-10-CM

## 2019-10-02 DIAGNOSIS — R11.0 NAUSEA: ICD-10-CM

## 2019-10-02 LAB
ALBUMIN SERPL BCP-MCNC: 3.8 G/DL (ref 3.5–5)
ALP SERPL-CCNC: 65 U/L (ref 46–116)
ALT SERPL W P-5'-P-CCNC: 24 U/L (ref 12–78)
ANION GAP SERPL CALCULATED.3IONS-SCNC: 6 MMOL/L (ref 4–13)
AST SERPL W P-5'-P-CCNC: 12 U/L (ref 5–45)
BASOPHILS # BLD AUTO: 0.08 THOUSANDS/ΜL (ref 0–0.1)
BASOPHILS NFR BLD AUTO: 1 % (ref 0–1)
BILIRUB SERPL-MCNC: 0.4 MG/DL (ref 0.2–1)
BUN SERPL-MCNC: 7 MG/DL (ref 5–25)
CALCIUM SERPL-MCNC: 9 MG/DL (ref 8.3–10.1)
CHLORIDE SERPL-SCNC: 108 MMOL/L (ref 100–108)
CO2 SERPL-SCNC: 27 MMOL/L (ref 21–32)
CREAT SERPL-MCNC: 0.8 MG/DL (ref 0.6–1.3)
EOSINOPHIL # BLD AUTO: 0.46 THOUSAND/ΜL (ref 0–0.61)
EOSINOPHIL NFR BLD AUTO: 7 % (ref 0–6)
ERYTHROCYTE [DISTWIDTH] IN BLOOD BY AUTOMATED COUNT: 12.8 % (ref 11.6–15.1)
GFR SERPL CREATININE-BSD FRML MDRD: 106 ML/MIN/1.73SQ M
GLUCOSE SERPL-MCNC: 86 MG/DL (ref 65–140)
HCT VFR BLD AUTO: 39.7 % (ref 34.8–46.1)
HGB BLD-MCNC: 12.7 G/DL (ref 11.5–15.4)
IMM GRANULOCYTES # BLD AUTO: 0.03 THOUSAND/UL (ref 0–0.2)
IMM GRANULOCYTES NFR BLD AUTO: 0 % (ref 0–2)
LYMPHOCYTES # BLD AUTO: 2.21 THOUSANDS/ΜL (ref 0.6–4.47)
LYMPHOCYTES NFR BLD AUTO: 31 % (ref 14–44)
MCH RBC QN AUTO: 28.5 PG (ref 26.8–34.3)
MCHC RBC AUTO-ENTMCNC: 32 G/DL (ref 31.4–37.4)
MCV RBC AUTO: 89 FL (ref 82–98)
MONOCYTES # BLD AUTO: 0.47 THOUSAND/ΜL (ref 0.17–1.22)
MONOCYTES NFR BLD AUTO: 7 % (ref 4–12)
NEUTROPHILS # BLD AUTO: 3.87 THOUSANDS/ΜL (ref 1.85–7.62)
NEUTS SEG NFR BLD AUTO: 54 % (ref 43–75)
NRBC BLD AUTO-RTO: 0 /100 WBCS
PLATELET # BLD AUTO: 350 THOUSANDS/UL (ref 149–390)
PMV BLD AUTO: 9.5 FL (ref 8.9–12.7)
POTASSIUM SERPL-SCNC: 3.6 MMOL/L (ref 3.5–5.3)
PROT SERPL-MCNC: 7.3 G/DL (ref 6.4–8.2)
RBC # BLD AUTO: 4.46 MILLION/UL (ref 3.81–5.12)
SODIUM SERPL-SCNC: 141 MMOL/L (ref 136–145)
TSH SERPL DL<=0.05 MIU/L-ACNC: 1.67 UIU/ML (ref 0.36–3.74)
WBC # BLD AUTO: 7.12 THOUSAND/UL (ref 4.31–10.16)

## 2019-10-02 PROCEDURE — 36415 COLL VENOUS BLD VENIPUNCTURE: CPT | Performed by: FAMILY MEDICINE

## 2019-10-02 PROCEDURE — 80053 COMPREHEN METABOLIC PANEL: CPT | Performed by: FAMILY MEDICINE

## 2019-10-02 PROCEDURE — 99214 OFFICE O/P EST MOD 30 MIN: CPT | Performed by: FAMILY MEDICINE

## 2019-10-02 PROCEDURE — 85025 COMPLETE CBC W/AUTO DIFF WBC: CPT | Performed by: FAMILY MEDICINE

## 2019-10-02 PROCEDURE — 84443 ASSAY THYROID STIM HORMONE: CPT | Performed by: FAMILY MEDICINE

## 2019-10-02 RX ORDER — ESCITALOPRAM OXALATE 5 MG/1
10 TABLET ORAL DAILY
COMMUNITY
Start: 2019-09-07 | End: 2019-12-06

## 2019-10-02 RX ORDER — BUPROPION HYDROCHLORIDE 150 MG/1
150 TABLET, EXTENDED RELEASE ORAL
COMMUNITY
Start: 2019-09-25

## 2019-10-02 NOTE — PROGRESS NOTES
Transition of Care Management Visit  Cale Anton 26 y o  female   MRN: [id number]    Assessment/Plan: Cale Anton is a 26 y o  female with:     1  Orthostatic hypotension  Borderline positive on exam today, advised to increase hydration to 60-80 ounces of water/day  2  Nausea  Unclear etiology, but no associated symptoms and otherwise normal exam, so will check labs  Will  - TSH, 3rd generation with Free T4 reflex  - CBC and differential  - Comprehensive metabolic panel    3  Thrombocytosis (HCC)  Platelet count at [facility], previously did have thrombocytopenia, recheck level  - CBC and differential    4  Anxiety and depression  F/w Psychiatry, currently on Lexapro and Wellbutrin    Subjective:  Cale Anton is a 26 y o  female here for Transition Care Management Visit  Pt initially admitted due to severe depression and SI after she started on Zoloft  She is now on Lexapro 10mg and Wellbutrin 100mg, and increased to 150mg  She initially was having LH and nausea that she thought was related to the new meds, but it has persisted  Pt states that the nausea is not related to food or movement  The LH is sporadic, unrelated to positions or hydration  She feels very fatigued and not correlating to her depression symptoms  She is sleeping ok, appetite is ok  Hospital Summary: 9/3-9/6  Cale Anton is a 26y o  year old female who was admitted to the Adult Inpatient Psychiatric Unit on a Voluntary commitment for worsening depression, and SI  See H&P for more information  [address] was treated with a multidisciplinary approach that included daily lethality assessments, pharmacotherapy, psychotherapy as well as consultation to hospital internal medicine  Medication management included [id number]  We discussed possible risks, benefits, side effects and alternatives (including no treatment) of short or long term use of psychiatric medications   Patient verbalized understanding and agreement at this time  Pt tolerated the medications and denies side effects  Riaz Beltran showed signs of improvement as indicated by improved mood, reduced anxiety, denial of suicidality or homicidality and no evidence of lethality while on the unit  Family involvement included mother  Safety plan included verifying that the pt does not have access to firearms and identifying support systems  The plan was reviewed with the pt  Pt was able to list her support systems as family and friends and states she would contact them in an emergency situation  Riaz Beltran was also given the phone numbers for Crisis and the [employer]  Review of Systems   Constitutional: Positive for fatigue  Negative for unexpected weight change  Gastrointestinal: Positive for nausea  Neurological: Positive for dizziness  Psychiatric/Behavioral: Positive for dysphoric mood  Negative for sleep disturbance  The patient is not nervous/anxious        Patient Active Problem List    Diagnosis Date Noted    Anxiety and depression 07/19/2019    Class 2 obesity due to excess calories without serious comorbidity with body mass index (BMI) of 38 0 to 38 9 in adult 07/19/2019    Vitamin D deficiency 05/07/2018    Eczema 11/22/2014    Moderate persistent asthma without complication 73/50/8215    Allergic rhinitis 02/12/2013    Dysmenorrhea 01/11/2013       Current Outpatient Medications:     albuterol (PROVENTIL HFA,VENTOLIN HFA) 90 mcg/act inhaler, Inhale 2 puffs every 4 (four) hours as needed  , Disp: , Rfl:     budesonide-formoterol (SYMBICORT) 160-4 5 mcg/act inhaler, Inhale 2 puffs 2 (two) times a day Rinse mouth after use , Disp: 1 Inhaler, Rfl: 0    buPROPion (WELLBUTRIN SR) 150 mg 12 hr tablet, Take 150 mg by mouth, Disp: , Rfl:     EPINEPHrine (EPIPEN 2-TATIANA) 0 3 mg/0 3 mL SOAJ, Inject as directed, Disp: , Rfl:     escitalopram (LEXAPRO) 5 mg tablet, Take 15 mg by mouth daily, Disp: , Rfl:     fexofenadine (ALLEGRA ALLERGY) 180 MG tablet, Take by mouth, Disp: , Rfl:     Levonorgest-Eth Estrad 91-Day (ASHLYNA) 0 15-0 03 &0 01 MG TABS, Take 1 tablet by mouth daily, Disp: , Rfl:     mometasone (ELOCON) 0 1 % cream, Apply topically daily, Disp: 50 g, Rfl: 3    Mometasone Furo-Formoterol Fum (DULERA) 200-5 MCG/ACT AERO, , Disp: , Rfl:     predniSONE 10 mg tablet, Take 10 mg by mouth daily, Disp: , Rfl: 0    Tiotropium Bromide Monohydrate (SPIRIVA RESPIMAT) 1 25 MCG/ACT AERS, , Disp: , Rfl:     XOLAIR 150 MG, , Disp: , Rfl:     Objective:  /70   Pulse 98   Temp 99 1 °F (37 3 °C)   Resp 16   Wt 108 kg (238 lb)   SpO2 98%   BMI 38 30 kg/m²   Physical Exam   Constitutional: She appears well-developed and well-nourished  No distress  HENT:   Head: Normocephalic and atraumatic  Cardiovascular: Normal rate, regular rhythm and normal heart sounds  No murmur heard  Pulmonary/Chest: Effort normal and breath sounds normal  No respiratory distress  She has no wheezes  Abdominal: Soft  Normal appearance and bowel sounds are normal  There is tenderness in the right upper quadrant  There is no rigidity, no rebound, no guarding and negative Heller's sign  Neurological: She is alert  Skin: She is not diaphoretic  Vitals reviewed  Transitional Care Management Review:  Roro Paniagua is a 26 y o  female here for TCM follow up  During the TCM phone call patient stated:  TCM Call (since 9/1/2019)     Date and time call was made  9/6/2019  2:55 PM    Hospital care reviewed  Records reviewed    Patient was hospitialized at  Other (comment)    Comment  [facility]    Date of Admission  09/03/19    Date of discharge  09/06/19    Diagnosis  SUICIDAL IDEATION    Disposition  Home    Were the patients medications reviewed and updated  Yes      TCM Call (since 9/1/2019)     Should patient be enrolled in anticoag monitoring? No    Scheduled for follow up?   Yes    I have advised the patient to call PCP with any new or worsening symptoms  Pauline Townsend, Medical Assistant        I have spent 25 minutes with Patient  today in which greater than 50% of this time was spent in counseling/coordination of care regarding Risks and benefits of tx options, Intructions for management, Patient and family education, Importance of treatment compliance and Impressions  Rufino Mead MD    Note: Portions of the record may have been created with voice recognition software  Occasional wrong word or "sound a like" substitutions may have occurred due to the inherent limitations of voice recognition software  Read the chart carefully and recognize, using context, where substitutions have occurred

## 2019-10-09 ENCOUNTER — OFFICE VISIT (OUTPATIENT)
Dept: FAMILY MEDICINE CLINIC | Facility: CLINIC | Age: 24
End: 2019-10-09
Payer: COMMERCIAL

## 2019-10-09 VITALS
TEMPERATURE: 98.9 F | DIASTOLIC BLOOD PRESSURE: 70 MMHG | BODY MASS INDEX: 38.46 KG/M2 | SYSTOLIC BLOOD PRESSURE: 100 MMHG | WEIGHT: 239 LBS

## 2019-10-09 DIAGNOSIS — L73.9 FOLLICULITIS: ICD-10-CM

## 2019-10-09 DIAGNOSIS — H00.014 HORDEOLUM EXTERNUM OF LEFT UPPER EYELID: Primary | ICD-10-CM

## 2019-10-09 PROCEDURE — 99214 OFFICE O/P EST MOD 30 MIN: CPT | Performed by: FAMILY MEDICINE

## 2019-10-09 NOTE — PROGRESS NOTES
FAMILY MEDICINE PROGRESS NOTE  Shwetha Tena 26 y o  female   DATE: October 9, 2019     ASSESSMENT and PLAN:  Shwetha Tena is a 26 y o  female with:     Problem List Items Addressed This Visit     None      Visit Diagnoses     Hordeolum externum of left upper eyelid    -  Primary    Folliculitis          Reviewed supportive care for hordeolum, continue warm compresses, no topical treatment  Can use topical Abx for folliculitis, do not shave with razor  If becomes symptomatic, can do topical CS that she uses for eczema  Increase hydration to prevent orthostatic hypotension, which is likely cause of her dizziness  Patient agreeable with the plan and expressed understanding  I discucssed signs and symptoms for which to RTC, go to ER or seek urgent medical care  SUBJECTIVE:  Shwetha Tena is a 26 y o  female who presents today with a chief complaint of Eye Problem (started couple days ago  lump on eyelid and eye was pink) and Insect Bite (right leg, mostly)  Pt is here with multiple issues  She has had left eyelid swelling for the past 3-4 days, no associated eye redness, photophobia, itching, seasonal allergies  She has been doing warm compresses and the symptoms did improve  Also has a few bumps on her right thigh that have been there for a few weeks  Also would like to review labs from our previous visit (CBC, CMP,TSH) all WNL  Review of Systems   Eyes: Negative for photophobia, pain, discharge, redness, itching and visual disturbance  Respiratory: Negative for shortness of breath  Allergic/Immunologic: Negative for environmental allergies  Neurological: Positive for dizziness  I have reviewed the patient's Past Medical History  OBJECTIVE:  /70 (BP Location: Left arm, Patient Position: Sitting, Cuff Size: Standard)   Temp 98 9 °F (37 2 °C)   Wt 108 kg (239 lb)   BMI 38 46 kg/m²    Physical Exam   Constitutional: She appears well-developed and well-nourished     Eyes: Conjunctivae are normal  Right eye exhibits no chemosis, no discharge, no exudate and no hordeolum  Left eye exhibits hordeolum  Left eye exhibits no chemosis, no discharge and no exudate  Right conjunctiva is not injected  Right conjunctiva has no hemorrhage  Left conjunctiva is not injected  Left conjunctiva has no hemorrhage  Skin:        Vitals reviewed  Minerva Castro MD    Note: Portions of the record have been created with voice recognition software  Occasional wrong word or "sound a like" substitutions may have occurred due to the inherent limitations of voice recognition software  Read the chart carefully and recognize, using context, where substitutions have occurred

## 2020-03-09 ENCOUNTER — TELEPHONE (OUTPATIENT)
Dept: ADMINISTRATIVE | Facility: OTHER | Age: 25
End: 2020-03-09

## 2020-03-09 ENCOUNTER — TELEPHONE (OUTPATIENT)
Dept: FAMILY MEDICINE CLINIC | Facility: CLINIC | Age: 25
End: 2020-03-09

## 2020-03-09 NOTE — TELEPHONE ENCOUNTER
----- Message from Gaurav Crockett MA sent at 3/9/2020  2:23 PM EDT -----  Regarding: Cervical cancer screening  Hello    I see this screening on care everywhere from 6/19, if you can please review and update      Thank you

## 2020-03-09 NOTE — TELEPHONE ENCOUNTER
Upon review of the In Basket request we were able to locate, review, and update the patient chart as requested for Pap Smear (HPV) aka Cervical Cancer Screening  Any additional questions or concerns should be emailed to the Practice Liaisons via [email or web address] email, please do not reply via In Basket      Thank you  Madeline Samuel MA

## 2020-03-09 NOTE — TELEPHONE ENCOUNTER
----- Message from Santiago Barrera MA sent at 3/9/2020  2:23 PM EDT -----  Regarding: Cervical cancer screening  Hello    I see this screening on care everywhere from 6/19, if you can please review and update      Thank you

## 2020-03-13 ENCOUNTER — OFFICE VISIT (OUTPATIENT)
Dept: FAMILY MEDICINE CLINIC | Facility: CLINIC | Age: 25
End: 2020-03-13
Payer: COMMERCIAL

## 2020-03-13 VITALS
BODY MASS INDEX: 36.99 KG/M2 | WEIGHT: 222 LBS | RESPIRATION RATE: 16 BRPM | OXYGEN SATURATION: 99 % | HEART RATE: 86 BPM | HEIGHT: 65 IN | TEMPERATURE: 99.1 F

## 2020-03-13 DIAGNOSIS — R60.0 SWELLING OF RIGHT PAROTID GLAND: Primary | ICD-10-CM

## 2020-03-13 DIAGNOSIS — L20.84 INTRINSIC ATOPIC DERMATITIS: ICD-10-CM

## 2020-03-13 DIAGNOSIS — Z91.010 PEANUT ALLERGY: ICD-10-CM

## 2020-03-13 PROCEDURE — 99213 OFFICE O/P EST LOW 20 MIN: CPT | Performed by: FAMILY MEDICINE

## 2020-03-13 PROCEDURE — 3008F BODY MASS INDEX DOCD: CPT | Performed by: FAMILY MEDICINE

## 2020-03-13 PROCEDURE — 1036F TOBACCO NON-USER: CPT | Performed by: FAMILY MEDICINE

## 2020-03-13 RX ORDER — EPINEPHRINE 0.3 MG/.3ML
0.3 INJECTION SUBCUTANEOUS ONCE
Qty: 3 EACH | Refills: 2 | Status: SHIPPED | OUTPATIENT
Start: 2020-03-13 | End: 2020-08-10 | Stop reason: SDUPTHER

## 2020-03-13 RX ORDER — NAPROXEN 500 MG/1
500 TABLET ORAL 2 TIMES DAILY WITH MEALS
Qty: 20 TABLET | Refills: 0 | Status: SHIPPED | OUTPATIENT
Start: 2020-03-13 | End: 2020-03-30 | Stop reason: SDUPTHER

## 2020-03-13 RX ORDER — HYDROXYZINE HYDROCHLORIDE 10 MG/1
10 TABLET, FILM COATED ORAL EVERY 8 HOURS PRN
COMMUNITY
Start: 2019-10-30

## 2020-03-13 RX ORDER — MOMETASONE FUROATE 1 MG/G
CREAM TOPICAL DAILY
Qty: 50 G | Refills: 3 | Status: SHIPPED | OUTPATIENT
Start: 2020-03-13 | End: 2020-10-20

## 2020-03-13 NOTE — PROGRESS NOTES
FAMILY MEDICINE PROGRESS NOTE  Mila Arias 27 y o  female   DATE: 2020     ASSESSMENT and PLAN:  Mila Arias is a 27 y o  female with:     1  Swelling of right parotid gland  Likely 2/2 acute viral illness, not likely salivary stone, not likely mumps since she is adequately immunized without contacts and it is not bilateral  Reviewed supportive care and add NSAIDs  - naproxen (NAPROSYN) 500 mg tablet; Take 1 tablet (500 mg total) by mouth 2 (two) times a day with meals for 10 days  Dispense: 20 tablet; Refill: 0    2  Intrinsic atopic dermatitis  Reviewed OTC care with hydrating lotions and use short course of Mometasone   - mometasone (ELOCON) 0 1 % cream; Apply topically daily  Dispense: 50 g; Refill: 3    3  Peanut allergy  Previously was seeing Allergy, needs refill since previous Rx   - EPINEPHrine (EpiPen 2-Gianni) 0 3 mg/0 3 mL SOAJ; Inject 0 3 mL (0 3 mg total) into a muscle once for 1 dose  Dispense: 3 each; Refill: 2    Patient agreeable with the plan and expressed understanding  I discucssed signs and symptoms for which to RTC, go to ER or seek urgent medical care  SUBJECTIVE:  Mila Arias is a 27 y o  female who presents today with a chief complaint of Adenopathy  For the past 1-2 weeks she has had right sided facial swelling  She does have a history of salivary stones for years, but this doesn't feel the same to her because in the past with a stone she would have more discomfort and not be able to eat anything sour, which is not the case right now  MMR vaccine UTD  Denies trismus, dysphagia, pain, TMJ dysfunction, fevers, chills, headache  Patient currently lives in [facility], no known [facility] exposures    Review of Systems   Constitutional: Negative for chills, fatigue and fever  HENT: Positive for facial swelling  Negative for congestion and mouth sores  Skin: Positive for rash  I have reviewed the patient's Past Medical History      OBJECTIVE:  Pulse 86   Temp 99 1 °F (37 3 °C)   Resp 16   Ht 5' 5" (1 651 m)   Wt 101 kg (222 lb)   LMP 02/13/2020 (Approximate)   SpO2 99%   Breastfeeding No   BMI 36 94 kg/m²    Physical Exam   Constitutional: She appears well-developed and well-nourished  No distress  HENT:   Head: Normocephalic and atraumatic  Right Ear: External ear normal    Left Ear: External ear normal    Nose: Right sinus exhibits no maxillary sinus tenderness and no frontal sinus tenderness  Left sinus exhibits no maxillary sinus tenderness and no frontal sinus tenderness  Mouth/Throat: Uvula is midline, oropharynx is clear and moist and mucous membranes are normal  No oral lesions  No trismus in the jaw  Normal dentition  No dental abscesses, uvula swelling or dental caries  No oropharyngeal exudate, posterior oropharyngeal edema, posterior oropharyngeal erythema or tonsillar abscesses  Eyes: Conjunctivae are normal  Right eye exhibits no discharge  Left eye exhibits no discharge  Neck: Normal range of motion  Neck supple  Cardiovascular: Normal rate and normal heart sounds  Pulmonary/Chest: Effort normal and breath sounds normal  No respiratory distress  She has no wheezes  She has no rales  Lymphadenopathy:     She has no cervical adenopathy  Skin: Rash (eczematous rash on right dorsal hand) noted  She is not diaphoretic  Vitals reviewed  P[place]leton Mary Phelan MD    Note: Portions of the record have been created with voice recognition software  Occasional wrong word or "sound a like" substitutions may have occurred due to the inherent limitations of voice recognition software  Read the chart carefully and recognize, using context, where substitutions have occurred

## 2020-03-13 NOTE — PATIENT INSTRUCTIONS
Sialoadenitis   WHAT YOU NEED TO KNOW:   Sialoadenitis is an inflammation or infection of one or more of your salivary glands  A small stone can block the salivary gland and cause inflammation  Infection may be caused by a virus or bacteria  You can develop sialoadenitis on one or both sides of your face  You may have sialoadenitis once, or it may come back and last a long time  DISCHARGE INSTRUCTIONS:   Manage your sialoadenitis:  It is important to manage your sialoadenitis to help prevent future infections  · Drinking liquids:  Adults should drink about 9 to 13 cups of liquid each day  One cup is 8 ounces  Good choices of liquids for most people include water, juice, and milk  Coffee, soup, and fruit may be counted in your daily liquid amount  Ask your caregiver how much liquid you should drink each day  · Keep your mouth moist:  Suck on hard candy or chew sugarless gum to get your saliva flowing  Sour and tart flavors such as lemon and orange will help get saliva to flow  This will help keep your mouth moist and help push out a stone blocking your salivary duct  · Rinse your mouth:  Use water or mouthwash to clean out pus that may be draining into your mouth  · Massage your jaw:  Massage the area of your swollen gland  This may help relieve swelling and pain by pushing the pus out of the gland  · Apply heat:  Place a warm, moist cloth on the area  Medicines:   · NSAIDs  help decrease swelling and pain or fever  This medicine is available with or without a doctor's order  NSAIDs can cause stomach bleeding or kidney problems in certain people  If you take blood thinner medicine, always ask your healthcare provider if NSAIDs are safe for you  Always read the medicine label and follow directions  · Do not give aspirin to children under 25years of age  Your child could develop Reye syndrome if he takes aspirin  Reye syndrome can cause life-threatening brain and liver damage   Check your child's medicine labels for aspirin, salicylates, or oil of wintergreen  · Pain medicine: You may be given medicine to take away or decrease pain  Do not wait until the pain is severe before you take your medicine  · Antibiotics: This medicine will help fight an infection  You will be given antibiotics if your sialoadenitis is caused by a bacterial infection  Take your antibiotics until they are gone, even if you feel better  · Take your medicine as directed  Contact your healthcare provider if you think your medicine is not helping or if you have side effects  Tell him of her if you are allergic to any medicine  Keep a list of the medicines, vitamins, and herbs you take  Include the amounts, and when and why you take them  Bring the list or the pill bottles to follow-up visits  Carry your medicine list with you in case of an emergency  Follow up with your healthcare provider or ear, nose, and throat specialist as directed:  Write down your questions so you remember to ask them during your visits  Contact your healthcare provider or ear, nose, and throat specialist if:   · The pain and swelling do not go away within 2 days, or they get worse  · Your mouth and eyes are very dry  · You lose movement on one side of your face  · You have questions about your condition or care  Return to the emergency department if:   · You have a fever  · Your salivary gland gets red and hot or drains pus  · You have trouble opening your mouth because of swelling  · You have trouble breathing or swallowing because of swelling  © 2017 [employer] Information is for End User's use only and may not be sold, redistributed or otherwise used for commercial purposes  All illustrations and images included in CareNotes® are the copyrighted property of A D A M [employer] Inc  or [employer]  The above information is an  only   It is not intended as medical advice for individual conditions or treatments  Talk to your doctor, nurse or pharmacist before following any medical regimen to see if it is safe and effective for you

## 2020-03-23 ENCOUNTER — TELEPHONE (OUTPATIENT)
Dept: FAMILY MEDICINE CLINIC | Facility: CLINIC | Age: 25
End: 2020-03-23

## 2020-03-23 ENCOUNTER — TELEMEDICINE (OUTPATIENT)
Dept: FAMILY MEDICINE CLINIC | Facility: CLINIC | Age: 25
End: 2020-03-23
Payer: COMMERCIAL

## 2020-03-23 DIAGNOSIS — R60.0 SWELLING OF RIGHT PAROTID GLAND: Primary | ICD-10-CM

## 2020-03-23 DIAGNOSIS — J45.40 MODERATE PERSISTENT ASTHMA WITHOUT COMPLICATION: ICD-10-CM

## 2020-03-23 PROCEDURE — 99214 OFFICE O/P EST MOD 30 MIN: CPT | Performed by: FAMILY MEDICINE

## 2020-03-23 NOTE — LETTER
March 23, 2020     Patient: Juana Jacobo   YOB: 1995   Date of Visit: 3/23/2020       To Whom it May Concern:    Juana Jacobo (team number [id number]) is under my professional care  She was seen by me on 3/23/2020  She has moderate to severe asthma, sees a Pulmonologist and is on multiple inhalers to control her symptoms  She is high risk for complications if she were to contract the COVID19 infection, so please accommodate her work schedule accordingly  If you have any questions or concerns, please don't hesitate to call  Sincerely,          Nica Verdin MD

## 2020-03-23 NOTE — TELEPHONE ENCOUNTER
Have her schedule a video visit so I can see what how swollen the side of her face is compared to last time    Ok to give her note for work

## 2020-03-23 NOTE — TELEPHONE ENCOUNTER
Patient called back with update  She has no improvement & is on her last day of antibiotics  Her face is still swollen, she has a salty taste constantly in her mouth , fever of 100 5 & her ear hurts  She is also looking for a note to excuse her for work for one month due to her asthma  They are offering one month leave at her work if she is high risk

## 2020-03-23 NOTE — PROGRESS NOTES
Virtual Regular Visit    Problem List Items Addressed This Visit        Respiratory    Moderate persistent asthma without complication      Other Visit Diagnoses     Swelling of right parotid gland    -  Primary        Now likely has superimposed infection of right parotid gland with possible right AOM, could not be examined by video visit  Restart Naproxen and add Augmentin for bacterial coverage  Recheck PRN  Also given that patient is high risk for infection with her asthma, provided work excuse note and faxed to number below  Reason for visit is ongoing facial swelling with fevers    Encounter provider Parisa Mendoza MD    Provider located at [place] [address]  [phone]    Recent Visits  No visits were found meeting these conditions  Showing recent visits within past 7 days and meeting all other requirements     Today's Visits  Date Type Provider Dept   03/23/20 Telephone Mikhail Jackson [address] [facility] today's visits and meeting all other requirements     Future Appointments  No visits were found meeting these conditions  Showing future appointments within next 150 days and meeting all other requirements     After connecting through [place], the patient was identified by name and date of birth  Nikos Bonilla was informed that this is a telemedicine visit and that the visit is being conducted through [address] [place] [address] which may not be secure and therefore, might not be HIPAA-compliant  My office door was closed  No one else was in the room  She acknowledged consent and understanding of privacy and security of the video platform  The patient has agreed to participate and understands they can discontinue the visit at any time  Subjective  Nikos Bonilla is a 27 y o  female with ongoing right facial swelling, she has completed 1 week of Naproxen, she states that it has improved her swelling, but still has mild swelling   She has been having fevers for the past 2 days, Tmax 100 9F with taking Naproxen this morning  Also has right ear pain as well  Also, patient works at [employer] and needs a note stating she has asthma and is higher risk for infection with COVID and has an option for leave through work  She sees a pulmonologist, but they would not issue a note without an appt and she currently lives in [place]  Fax number- [phone], team number [id number]    Past Medical History:   Diagnosis Date    Concussion     Last Assessed:  10/28/13    History of chicken pox     Varicella        Past Surgical History:   Procedure Laterality Date    EAR SURGERY      Pressure equalization tube insertion, bilaterally    NOSE SURGERY      Resolved:  2011    SINUS SURGERY         Current Outpatient Medications   Medication Sig Dispense Refill    albuterol (PROVENTIL HFA,VENTOLIN HFA) 90 mcg/act inhaler Inhale 2 puffs every 4 (four) hours as needed        budesonide-formoterol (SYMBICORT) 160-4 5 mcg/act inhaler Inhale 2 puffs 2 (two) times a day Rinse mouth after use   1 Inhaler 0    buPROPion (WELLBUTRIN SR) 150 mg 12 hr tablet Take 150 mg by mouth      EPINEPHrine (EpiPen 2-Gianni) 0 3 mg/0 3 mL SOAJ Inject 0 3 mL (0 3 mg total) into a muscle once for 1 dose 3 each 2    fexofenadine (ALLEGRA ALLERGY) 180 MG tablet Take by mouth      hydrOXYzine HCL (ATARAX) 10 mg tablet Take 10 mg by mouth every 8 (eight) hours as needed      Levonorgest-Eth Estrad 91-Day (ASHLYNA) 0 15-0 03 &0 01 MG TABS Take 1 tablet by mouth daily      mometasone (ELOCON) 0 1 % cream Apply topically daily 50 g 3    Mometasone Furo-Formoterol Fum (DULERA) 200-5 MCG/ACT AERO       naproxen (NAPROSYN) 500 mg tablet Take 1 tablet (500 mg total) by mouth 2 (two) times a day with meals for 10 days 20 tablet 0    predniSONE 10 mg tablet Take 10 mg by mouth daily  0    Tiotropium Bromide Monohydrate (SPIRIVA RESPIMAT) 1 25 MCG/ACT AERS       XOLAIR 150 MG        No current facility-administered medications for this visit  Allergies   Allergen Reactions    Peanut Oil Anaphylaxis    Treenut  [Nuts] Anaphylaxis and Hives     Peanuts & treenuts  Tree nuts    Molds & Smuts     Other Other (See Comments)     z       Review of Systems   Constitutional: Positive for fever  Negative for chills  HENT: Positive for ear pain and facial swelling  Negative for congestion  Respiratory: Negative for cough, chest tightness and shortness of breath  Physical Exam   Constitutional: She appears well-developed and well-nourished  No distress  HENT:   Head: Normocephalic and atraumatic  Mouth/Throat: Oropharynx is clear and moist    Eyes: Conjunctivae are normal  Right eye exhibits no discharge  Left eye exhibits no discharge  Neck: Normal range of motion  Skin: She is not diaphoretic  Vitals reviewed  I spent 22 minutes with the patient during this visit

## 2020-03-25 ENCOUNTER — TELEPHONE (OUTPATIENT)
Dept: FAMILY MEDICINE CLINIC | Facility: CLINIC | Age: 25
End: 2020-03-25

## 2020-03-25 NOTE — TELEPHONE ENCOUNTER
----- Message from [facility]   Tejal Thompson MD sent at 3/23/2020  5:06 PM EDT -----  Please fax communication for this patient to Fax number- [phone]  Thanks

## 2020-03-30 ENCOUNTER — TELEPHONE (OUTPATIENT)
Dept: FAMILY MEDICINE CLINIC | Facility: CLINIC | Age: 25
End: 2020-03-30

## 2020-03-30 DIAGNOSIS — R60.0 SWELLING OF RIGHT PAROTID GLAND: ICD-10-CM

## 2020-03-30 RX ORDER — NAPROXEN 500 MG/1
500 TABLET ORAL 2 TIMES DAILY WITH MEALS
Qty: 20 TABLET | Refills: 0 | Status: SHIPPED | OUTPATIENT
Start: 2020-03-30 | End: 2020-10-20 | Stop reason: ALTCHOICE

## 2020-03-30 RX ORDER — AMOXICILLIN AND CLAVULANATE POTASSIUM 875; 125 MG/1; MG/1
1 TABLET, FILM COATED ORAL EVERY 12 HOURS SCHEDULED
Qty: 14 TABLET | Refills: 0 | Status: SHIPPED | OUTPATIENT
Start: 2020-03-30 | End: 2020-04-06

## 2020-03-30 NOTE — TELEPHONE ENCOUNTER
Patient called stating she was to get an antibiotic sent in for her  She went to pharmacy but nothing was called in  Please advise  Patient would like a return call   [phone]

## 2020-04-29 ENCOUNTER — TELEMEDICINE (OUTPATIENT)
Dept: FAMILY MEDICINE CLINIC | Facility: CLINIC | Age: 25
End: 2020-04-29
Payer: COMMERCIAL

## 2020-04-29 DIAGNOSIS — R60.0 SWELLING OF RIGHT PAROTID GLAND: Primary | ICD-10-CM

## 2020-04-29 PROCEDURE — 99213 OFFICE O/P EST LOW 20 MIN: CPT | Performed by: FAMILY MEDICINE

## 2020-05-13 ENCOUNTER — HOSPITAL ENCOUNTER (OUTPATIENT)
Dept: ULTRASOUND IMAGING | Facility: HOSPITAL | Age: 25
Discharge: HOME | End: 2020-05-13
Payer: COMMERCIAL

## 2020-05-13 DIAGNOSIS — R60.0 SWELLING OF RIGHT PAROTID GLAND: ICD-10-CM

## 2020-05-13 PROCEDURE — 76536 US EXAM OF HEAD AND NECK: CPT

## 2020-05-14 ENCOUNTER — TELEPHONE (OUTPATIENT)
Dept: FAMILY MEDICINE CLINIC | Facility: CLINIC | Age: 25
End: 2020-05-14

## 2020-08-10 DIAGNOSIS — Z30.011 OCP (ORAL CONTRACEPTIVE PILLS) INITIATION: Primary | ICD-10-CM

## 2020-08-10 DIAGNOSIS — Z91.010 PEANUT ALLERGY: ICD-10-CM

## 2020-08-10 RX ORDER — EPINEPHRINE 0.3 MG/.3ML
0.3 INJECTION SUBCUTANEOUS ONCE
Qty: 3 EACH | Refills: 2 | Status: SHIPPED | OUTPATIENT
Start: 2020-08-10 | End: 2020-10-20

## 2020-08-10 NOTE — TELEPHONE ENCOUNTER
Patient called requesting refill on Epinephrine 0 3 90 day with refills to [facility]

## 2020-08-11 RX ORDER — LEVONORGESTREL / ETHINYL ESTRADIOL AND ETHINYL ESTRADIOL 150-30(84)
1 KIT ORAL DAILY
Qty: 91 EACH | Refills: 2 | Status: SHIPPED | OUTPATIENT
Start: 2020-08-11

## 2020-08-11 NOTE — TELEPHONE ENCOUNTER
Patient called back & said her OB/GYN called RX into pharmacy   Cancelled birth control & epinephren pens order today at [facility]

## 2020-08-11 NOTE — TELEPHONE ENCOUNTER
Patient called back to follow up on her refill request  She wanted her birth control ASHLYNA normally ordered by OB/GYN not epinephrine  Can this be sent in for her she is out of medication? She said OB told her to contact PCP      Jhony Arredondo 92

## 2021-07-16 ENCOUNTER — OFFICE VISIT (OUTPATIENT)
Dept: URGENT CARE | Facility: MEDICAL CENTER | Age: 26
End: 2021-07-16
Payer: COMMERCIAL

## 2021-07-16 VITALS
WEIGHT: 205 LBS | OXYGEN SATURATION: 100 % | HEIGHT: 65 IN | TEMPERATURE: 99.9 F | HEART RATE: 78 BPM | BODY MASS INDEX: 34.16 KG/M2 | RESPIRATION RATE: 18 BRPM

## 2021-07-16 DIAGNOSIS — J01.10 ACUTE NON-RECURRENT FRONTAL SINUSITIS: Primary | ICD-10-CM

## 2021-07-16 PROCEDURE — 99213 OFFICE O/P EST LOW 20 MIN: CPT | Performed by: PHYSICIAN ASSISTANT

## 2021-07-16 RX ORDER — AMOXICILLIN AND CLAVULANATE POTASSIUM 875; 125 MG/1; MG/1
1 TABLET, FILM COATED ORAL EVERY 12 HOURS SCHEDULED
Qty: 14 TABLET | Refills: 0 | Status: SHIPPED | OUTPATIENT
Start: 2021-07-16 | End: 2021-07-23

## 2021-07-16 NOTE — PROGRESS NOTES
[facility]        NAME: Anastasia Conn is a 23 y o  female  : 1995    MRN: [id number]  DATE: 2021  TIME: 3:22 PM    Assessment and Plan   Acute non-recurrent frontal sinusitis [J01 10]  1  Acute non-recurrent frontal sinusitis           Patient Instructions     Sinusitis  augmentin twice daily  Follow up with PCP in 3-5 days  Proceed to  ER if symptoms worsen  Chief Complaint     Chief Complaint   Patient presents with    Cold Like Symptoms     sinus congenstion and sore throat that started yesterday is vaccinated          History of Present Illness       22 y/o female presents c/o congestion, runny nose x 5 days and sinus pressure/ pain x 2 days  States she has used over the counter medications with no relief  Denies fever, chills, chest pain, cough, SOB      Review of Systems   Review of Systems   Constitutional: Negative for activity change, appetite change, chills, diaphoresis, fatigue and fever  HENT: Positive for congestion, rhinorrhea, sinus pressure, sinus pain and sore throat  Negative for ear discharge, ear pain, facial swelling, hearing loss, mouth sores, nosebleeds, postnasal drip, sneezing and voice change  Respiratory: Positive for cough  Negative for apnea, choking, chest tightness, shortness of breath, wheezing and stridor  Cardiovascular: Negative            Current Medications       Current Outpatient Medications:     albuterol (ProAir HFA) 90 mcg/act inhaler, Inhale 2 puffs every 6 (six) hours as needed for wheezing And 15 min before exersion, Disp: 54 g, Rfl: 0    buPROPion (WELLBUTRIN SR) 150 mg 12 hr tablet, Take 150 mg by mouth, Disp: , Rfl:     fexofenadine (ALLEGRA ALLERGY) 180 MG tablet, Take by mouth, Disp: , Rfl:     fluticasone-salmeterol (ADVAIR HFA) 115-21 MCG/ACT inhaler, Inhale 2 puffs 2 (two) times a day Rinse mouth after use , Disp: 108 g, Rfl: 3    hydrOXYzine HCL (ATARAX) 10 mg tablet, Take 10 mg by mouth every 8 (eight) hours as needed, Disp: , Rfl:     Levonorgest-Eth Estrad 91-Day (Ashlyna) 0 15-0 03 &0 01 MG TABS, Take 1 tablet by mouth daily, Disp: 91 each, Rfl: 2    mometasone (ELOCON) 0 1 % cream, Apply topically daily, Disp: 40 g, Rfl: 3    tiotropium (SPIRIVA RESPIMAT) 1 25 MCG/ACT AERS inhaler, Inhale 2 puffs daily, Disp: 36 g, Rfl: 3    EPINEPHrine (EpiPen 2-Gianni) 0 3 mg/0 3 mL SOAJ, Inject 0 3 mL (0 3 mg total) into a muscle once for 1 dose, Disp: 3 each, Rfl: 2    predniSONE 10 mg tablet, Take with food  4 tabs daily x 3 days, then 3 daily x 3 days, then 2 daily x 3 days, then 1 daily x 3 days (Patient not taking: Reported on 7/16/2021), Disp: 30 tablet, Rfl: 1    Current Allergies     Allergies as of 07/16/2021 - Reviewed 07/16/2021   Allergen Reaction Noted    Peanut oil - food allergy Anaphylaxis 03/11/2015    Treenut  [nuts - food allergy] Anaphylaxis and Hives 04/16/2007    Molds & smuts  01/11/2018    Other Other (See Comments) 10/05/2013            The following portions of the patient's history were reviewed and updated as appropriate: allergies, current medications, past family history, past medical history, past social history, past surgical history and problem list      Past Medical History:   Diagnosis Date    Anxiety     Asthma     Concussion     Last Assessed:  10/28/13    Depression     History of chicken pox     Varicella        Past Surgical History:   Procedure Laterality Date    EAR SURGERY      Pressure equalization tube insertion, bilaterally    NOSE SURGERY      Resolved:  2011    SINUS SURGERY         Family History   Problem Relation Age of Onset    Colon cancer Maternal Grandmother     Heart disease Maternal Grandfather     Hypertension Paternal Grandmother     Heart disease Paternal Grandfather     No Known Problems Mother     No Known Problems Father          Medications have been verified          Objective   Pulse 78   Temp 99 9 °F (37 7 °C)   Resp 18   Ht 5' 5" (1 651 m) Wt 93 kg (205 lb)   SpO2 100%   BMI 34 11 kg/m²        Physical Exam     Physical Exam  Constitutional:       General: She is not in acute distress  Appearance: She is well-developed  She is not diaphoretic  HENT:      Head: Normocephalic and atraumatic  Right Ear: Hearing, tympanic membrane, ear canal and external ear normal       Left Ear: Hearing, tympanic membrane, ear canal and external ear normal       Nose: Rhinorrhea present  Right Sinus: Maxillary sinus tenderness present  Left Sinus: Maxillary sinus tenderness present  Mouth/Throat:      Pharynx: Uvula midline  Cardiovascular:      Rate and Rhythm: Normal rate and regular rhythm  Heart sounds: Normal heart sounds  Pulmonary:      Effort: Pulmonary effort is normal       Breath sounds: Normal breath sounds  Musculoskeletal:      Cervical back: Normal range of motion and neck supple  Lymphadenopathy:      Cervical: Cervical adenopathy present

## 2021-07-16 NOTE — PATIENT INSTRUCTIONS
Sinusitis  augmentin twice daily  Follow up with PCP in 3-5 days  Proceed to  ER if symptoms worsen  Rhinosinusitis   WHAT YOU NEED TO KNOW:   Rhinosinusitis (RS) is inflammation of your nose and sinuses  It commonly begins as a virus, often as a common cold  Viruses usually last 7 to 10 days and do not need treatment  When the virus does not get better on its own, you may have bacterial RS  This means that bacteria have begun to grow inside your sinuses  Acute RS lasts less than 4 weeks  Chronic RS lasts 12 weeks or more  Recurrent RS is when you have 4 or more episodes of RS in one year  DISCHARGE INSTRUCTIONS:   Return to the emergency department if:   · Your eye and eyelid are red, swollen, and painful  · You cannot open your eye  · You have double vision or you cannot see  · Your eyeball bulges out or you cannot move your eye  · You are more sleepy than normal or you notice changes in your ability to think, move, or talk  · You have a stiff neck, a fever, or a bad headache  · You have swelling of your forehead or scalp  Contact your healthcare provider if:   · Your symptoms are worse or do not improve after 3 to 5 days of treatment  · You have questions or concerns about your condition or care  Medicines: You may need any of the following:  · Acetaminophen  decreases pain and fever  It is available without a doctor's order  Ask how much to take and how often to take it  Follow directions  Acetaminophen can cause liver damage if not taken correctly  · NSAIDs , such as ibuprofen, help decrease swelling, pain, and fever  This medicine is available with or without a doctor's order  NSAIDs can cause stomach bleeding or kidney problems in certain people  If you take blood thinner medicine, always ask your healthcare provider if NSAIDs are safe for you  Always read the medicine label and follow directions      · Nasal steroid sprays  decrease inflammation in your nose and sinuses  · Decongestants  reduce swelling and drain mucus in the nose and sinuses  They may help you breathe easier  · Antihistamines  dry mucus in the nose and relieve sneezing  · Antibiotics  treat a bacterial infection and may be needed if your symptoms do not improve or they get worse  · Take your medicine as directed  Contact your healthcare provider if you think your medicine is not helping or if you have side effects  Tell him or her if you are allergic to any medicine  Keep a list of the medicines, vitamins, and herbs you take  Include the amounts, and when and why you take them  Bring the list or the pill bottles to follow-up visits  Carry your medicine list with you in case of an emergency  Self-care:   · Rinse your sinuses  Use a sinus rinse device to rinse your nasal passages with a saline (salt water) solution  This will help thin the mucus in your nose and rinse away pollen and dirt  It will also help reduce swelling so you can breathe normally  Ask your healthcare provider how often to do this  · Breathe in steam   Heat a bowl of water until you see steam  Lean over the bowl and make a tent over your head with a large towel  Breathe deeply for about 20 minutes  Be careful not to get too close to the steam or burn yourself  Do this 3 times a day  You can also breathe deeply when you take a hot shower  · Sleep with your head elevated  Place an extra pillow under your head before you go to sleep to help your sinuses drain  · Drink liquids as directed  Ask your healthcare provider how much liquid to drink each day and which liquids are best for you  Liquids will thin the mucus in your nose and help it drain  Avoid drinks that contain alcohol or caffeine  · Do not smoke, and avoid secondhand smoke  Nicotine and other chemicals in cigarettes and cigars can make your symptoms worse   Ask your healthcare provider for information if you currently smoke and need help to quit  E-cigarettes or smokeless tobacco still contain nicotine  Talk to your healthcare provider before you use these products  Follow up with your healthcare provider as directed: Follow up if your symptoms are worse or not better after 3 to 5 days of treatment  Write down your questions so you remember to ask them during your visits  © Copyright [address] Information is for End User's use only and may not be sold, redistributed or otherwise used for commercial purposes  All illustrations and images included in CareNotes® are the copyrighted property of [employer]  or [address] [place], [place] [address]   The above information is an  only  It is not intended as medical advice for individual conditions or treatments  Talk to your doctor, nurse or pharmacist before following any medical regimen to see if it is safe and effective for you

## 2022-03-20 NOTE — RESULT NOTES
Verified Results  XR ENTIRE SPINE 6+ VIEW ( scoliosis) [id number] 12:00AM Bishop Pandey     Test Name Result Flag Reference   XR ENTIRE SPINE 6+ VW (scoliosis) (Report)     SCOLIOSIS      INDICATION: Idiopathic scoliosis  COMPARISON: None     VIEWS: Erect PA and lateral views thoracolumbar spine; 8 images     FINDINGS:     There is no fracture, pathologic bone lesion or congenital segmentation anomaly  Approximately 10 degrees of levoscoliosis measured between the superior endplate of T3 and inferior endplate of L4 noted  IMPRESSION:     Thoracolumbar levoscoliosis (10 degrees)         Workstation performed: [id number]     Signed by:   Renda Severe, MD   3/24/16 66.2